# Patient Record
Sex: FEMALE | Race: WHITE
[De-identification: names, ages, dates, MRNs, and addresses within clinical notes are randomized per-mention and may not be internally consistent; named-entity substitution may affect disease eponyms.]

---

## 2017-08-21 ENCOUNTER — HOSPITAL ENCOUNTER (INPATIENT)
Dept: HOSPITAL 56 - MW.ED | Age: 82
LOS: 3 days | Discharge: HOME HEALTH | DRG: 689 | End: 2017-08-24
Attending: FAMILY MEDICINE | Admitting: FAMILY MEDICINE
Payer: MEDICARE

## 2017-08-21 DIAGNOSIS — D50.8: ICD-10-CM

## 2017-08-21 DIAGNOSIS — M25.551: ICD-10-CM

## 2017-08-21 DIAGNOSIS — E11.9: ICD-10-CM
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
## 2017-08-21 DIAGNOSIS — R26.2: ICD-10-CM

## 2017-08-21 DIAGNOSIS — M54.5: ICD-10-CM

## 2017-08-21 DIAGNOSIS — N39.0: Primary | ICD-10-CM

## 2017-08-21 DIAGNOSIS — K57.90: ICD-10-CM

## 2017-08-21 DIAGNOSIS — D50.9: ICD-10-CM
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
## 2017-08-21 DIAGNOSIS — Z88.8: ICD-10-CM
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
## 2017-08-21 DIAGNOSIS — M25.552: ICD-10-CM

## 2017-08-21 DIAGNOSIS — R10.9: ICD-10-CM

## 2017-08-21 DIAGNOSIS — Z95.0: ICD-10-CM

## 2017-08-21 DIAGNOSIS — Z79.899: ICD-10-CM

## 2017-08-21 DIAGNOSIS — J18.9: ICD-10-CM

## 2017-08-21 DIAGNOSIS — I10: ICD-10-CM

## 2017-08-21 LAB
CHLORIDE SERPL-SCNC: 104 MMOL/L (ref 98–110)
SODIUM SERPL-SCNC: 140 MMOL/L (ref 136–146)

## 2017-08-21 PROCEDURE — 85045 AUTOMATED RETICULOCYTE COUNT: CPT

## 2017-08-21 PROCEDURE — 72170 X-RAY EXAM OF PELVIS: CPT

## 2017-08-21 PROCEDURE — 96372 THER/PROPH/DIAG INJ SC/IM: CPT

## 2017-08-21 PROCEDURE — 72100 X-RAY EXAM L-S SPINE 2/3 VWS: CPT

## 2017-08-21 PROCEDURE — 96361 HYDRATE IV INFUSION ADD-ON: CPT

## 2017-08-21 PROCEDURE — 88104 CYTOPATH FL NONGYN SMEARS: CPT

## 2017-08-21 PROCEDURE — 72192 CT PELVIS W/O DYE: CPT

## 2017-08-21 PROCEDURE — 82607 VITAMIN B-12: CPT

## 2017-08-21 PROCEDURE — 96374 THER/PROPH/DIAG INJ IV PUSH: CPT

## 2017-08-21 PROCEDURE — 99285 EMERGENCY DEPT VISIT HI MDM: CPT

## 2017-08-21 PROCEDURE — 74176 CT ABD & PELVIS W/O CONTRAST: CPT

## 2017-08-21 PROCEDURE — 84484 ASSAY OF TROPONIN QUANT: CPT

## 2017-08-21 PROCEDURE — 85025 COMPLETE CBC W/AUTO DIFF WBC: CPT

## 2017-08-21 PROCEDURE — 80053 COMPREHEN METABOLIC PANEL: CPT

## 2017-08-21 PROCEDURE — 96376 TX/PRO/DX INJ SAME DRUG ADON: CPT

## 2017-08-21 PROCEDURE — 82746 ASSAY OF FOLIC ACID SERUM: CPT

## 2017-08-21 PROCEDURE — 36415 COLL VENOUS BLD VENIPUNCTURE: CPT

## 2017-08-21 PROCEDURE — 82962 GLUCOSE BLOOD TEST: CPT

## 2017-08-21 PROCEDURE — 81001 URINALYSIS AUTO W/SCOPE: CPT

## 2017-08-21 PROCEDURE — 93005 ELECTROCARDIOGRAM TRACING: CPT

## 2017-08-21 PROCEDURE — G0378 HOSPITAL OBSERVATION PER HR: HCPCS

## 2017-08-21 PROCEDURE — 83880 ASSAY OF NATRIURETIC PEPTIDE: CPT

## 2017-08-21 PROCEDURE — 82728 ASSAY OF FERRITIN: CPT

## 2017-08-21 PROCEDURE — 80048 BASIC METABOLIC PNL TOTAL CA: CPT

## 2017-08-21 PROCEDURE — 85610 PROTHROMBIN TIME: CPT

## 2017-08-21 PROCEDURE — 83550 IRON BINDING TEST: CPT

## 2017-08-21 PROCEDURE — 97161 PT EVAL LOW COMPLEX 20 MIN: CPT

## 2017-08-21 PROCEDURE — 71010: CPT

## 2017-08-21 RX ADMIN — ONDANSETRON PRN MG: 4 TABLET, ORALLY DISINTEGRATING ORAL at 16:49

## 2017-08-21 RX ADMIN — Medication SCH DROP: at 22:08

## 2017-08-21 NOTE — CT
EXAMINATION: CT pelvis without contrast

 

HISTORY: Fall

 

COMPARISON: 3/6/2017

 

TECHNIQUE: Axial CT images obtained through the pelvis without contrast. Coronal and sagittal recons
tructions obtained.

 

FINDINGS: The osseous structures overall appear mildly osteopenic. No fracture or acute osseous abno
rmality is noted. The SI joints are symmetric. The sacrum appears grossly intact. The iliopectineal 
lines are preserved. Joint spaces appear preserved. Mild to moderate osteophyte formation noted with
in the hips. Hemangiomas are noted within the lower thoracic spine. Moderate facet hypertrophy also 
present at L4-L5 and L5-S1.

 

Diverticulosis without evidence of diverticulitis. Otherwise the visualized intrapelvic structures a
ppear grossly unremarkable.

 

IMPRESSION: 

1. Degenerative changes without acute findings.

2. Diverticulosis.

## 2017-08-21 NOTE — PCM.HP
H&P History of Present Illness





- General


Date of Service: 08/21/17


Admit Problem/Dx: 


 Admission Diagnosis/Problem





Admission Diagnosis/Problem      Ambulatory dysfunction








Source of Information: Patient


History Limitations: Reports: No Limitations





- History of Present Illness


Initial Comments - Free Text/Narative: 





82-year-old female with a medical history of hypertension, type 2 diabetes that 

also has a pacemaker placed that is being admitted secondary to low back and 

left hip pain after suffering a fall this morning. Patient woke up early this 

morning with low back pain and was sitting on the edge of the bed and states 

that she slipped off the edge of the bed and fell onto her butt. Patient did 

not fall onto her hip and states that she only fell onto her butt. She did not 

lose consciousness and did not have any headache or dizziness prior to the 

fall. She states that with the fall she did bump her head against a bedside 

table but does not currently endorse any headache or dizziness. The fall was 

unwitnessed. She was able to get up and ambulate to the living room to get her 

walker which she usually only uses during the night when she needs to get up 

and go to the bathroom. Otherwise, she ambulates independently without 

assistive devices. Patient has never had any surgeries to her low back or hips. 

She rates her left hip and low back pain as 8 out of 10 at its worse. She does 

have increased weakness on the left lower side secondary to pain but denies any 

numbness or tingling. Patient does not have a history of seizures. She has no 

history of osteoporosis or osteopenia. Patient denies any chest pain, 

palpitations, shortness of breath, wheezing, cough, abdominal pain, nausea, 

vomiting, constipation, diarrhea, headache, dizziness, vision problems, tinnitus

, fever. Patient does currently live on her own.





ER course:


Pelvic x-ray shows degenerative changes within the joint spaces bilaterally at 

the hips. No definite displaced fracture. Follow-up pelvic CT shows no fracture 

and only degenerative changes. Patient does have diverticulosis. Chest x-ray 

done in the emergency room shows no acute processes. Lumbar spine x-ray shows 

no fracture and normal anatomical alignment. There are bone spurs at disc 

spaces and hypertrophic changes of the lumbar facets. No significant listhesis 

present. CBC showed a normal white blood cell count with a platelet count of 120

,000. BUN was mildly elevated at 25 with a normal creatinine. Urinalysis was 

negative for nitrites but did show +3 bacteria. Patient denies any discomfort 

with urination. Patient did receive morphine 2 while in the emergency room and 

also normal saline bolus.


  ** Left Hip


Pain Score (Numeric/FACES): 9





  ** Lower Back


Pain Score (Numeric/FACES): 6





- Related Data


Allergies/Adverse Reactions: 


 Allergies











Allergy/AdvReac Type Severity Reaction Status Date / Time


 


codeine Allergy Intermediate Rash Verified 01/18/17 06:33


 


celecoxib [From Celebrex] Allergy  Rash Verified 01/18/17 06:33


 


propoxyphene [From Darvon] Allergy  Rash Verified 08/21/17 07:46











Home Medications: 


 Home Meds





SitaGLIPtin [Januvia] 100 mg PO DAILY 09/18/16 [History]


amLODIPine [Norvasc] 5 mg PO BEDTIME 09/18/16 [History]


Ascorbic Acid [Vitamin C] 500 mg PO DAILY 01/18/17 [History]


Calcium Carbonate/Vitamin D3 [Calcium 600 + Vit D Tablet] 1 tab PO DAILY 01/18/ 17 [History]


Metoprolol Succinate [Toprol XL] 25 mg PO DAILY 01/18/17 [History]


Rosuvastatin [Crestor] 10 mg PO BEDTIME 01/18/17 [History]


Fluticasone Propionate [Flonase Allergy Relief] 2 sprays NASBOTH DAILY 08/21/17 

[History]


Propylene Glycol/Peg 400 [Systane 0.3-0.4% Eye Drops] 1 drop EYEBOTH TID 08/21/ 17 [History]











Past Medical History


Cardiovascular History: Reports: Hypertension


Respiratory History: Reports: None


Other Respiratory History: Hx of pneumonia


Gastrointestinal History: Reports: None


Neurological History: Reports: None


Psychiatric History: Reports: None


Endocrine/Metabolic History: Reports: Diabetes, Type II


Hematologic History: Reports: None


Other Oncologic History: as stated by the pt she has ca of the " pelvis, groin 

and chest" and she was ca free for 3 years now and that she is following up 

with her oncology dr.


Dermatologic History: Reports: None





- Infectious Disease History


Infectious Disease History: Reports: None





- Past Surgical History


HEENT Surgical History: Reports: Eye Surgery


Other HEENT Surgeries/Procedures: Wears glasses


Cardiovascular Surgical History: Reports: Pacer


Female  Surgical History: Reports: Hysterectomy


Musculoskeletal Surgical History: Reports: Knee Replacement





Social & Family History





- Family History


Family Medical History: Noncontributory


Cardiac: Reports: Hypertension, MI


GI: Reports: Other (See Below)


Other GI Family History: Intestinal CA


Endocrine/Metabolic: Reports: Diabetes, type II





- Tobacco Use


Smoking Status *Q: Never Smoker


Second Hand Smoke Exposure: No





- Caffeine Use


Caffeine Use: Reports: None





- Recreational Drug Use


Recreational Drug Use: No





H&P Review of Systems





- Review of Systems:


Review Of Systems: See Below


General: Reports: No Symptoms


HEENT: Reports: No Symptoms


Pulmonary: Reports: No Symptoms


Cardiovascular: Reports: No Symptoms


Gastrointestinal: Reports: No Symptoms


Genitourinary: Reports: No Symptoms


Musculoskeletal: Reports: Other (Left hip pain with low back pain.)


Skin: Reports: No Symptoms


Psychiatric: Reports: No Symptoms


Neurological: Reports: No Symptoms


Hematologic/Lymphatic: Reports: No Symptoms


Immunologic: Reports: No Symptoms





Exam





- Exam


Exam: See Below





- Vital Signs


Vital Signs: 


 Last Vital Signs











Temp  98.5 F   08/21/17 12:07


 


Pulse  76   08/21/17 12:07


 


Resp  18   08/21/17 12:07


 


BP  141/65 H  08/21/17 12:07


 


Pulse Ox  94 L  08/21/17 12:07











Weight: 179 lb 7.3 oz





- Exam


Quality Assessment: DVT Prophylaxis (SCDs, Lovenox.)


General: Alert, Oriented, Cooperative


HEENT: Conjunctiva Clear, EOMI, Hearing Intact, Mucosa Moist & Pink


Neck: Supple, Trachea Midline, 2


Lungs: Clear to Auscultation, Normal Respiratory Effort


Cardiovascular: Regular Rate, Regular Rhythm


GI/Abdominal Exam: Normal Bowel Sounds, Soft, Non-Tender, No Organomegaly, No 

Distention, No Abnormal Bruit, No Mass


Extremities: Other (Pain with palpation over the left greater trochanter of the 

left hip. Patient also has pain with palpation of the low back. Strength is 4 

out of 5 in the left lower extremity secondary to pain. No bruising, erythema 

or redness noted to the left hip. There is no shortening of the left lower 

extremity and the foot is not turned outward. No calf tenderness with palpation 

bilaterally.)


Peripheral Pulses: 2+: Radial (L), Radial (R), Posterior Tibial (L), Posterior 

Tibial (R)


Skin: Warm, Dry, Intact


Neuro Extensive - Mental Status: Alert, Oriented x3, Normal Mood/Affect, Normal 

Cognition


Neuro Extensive - Motor, Sensory, Reflexes: CN II-XII Intact, Other (Sensation 

remains intact light touch in the upper and lower extremities bilaterally.)


Psychiatric: Alert, Normal Affect, Normal Mood





- Patient Data


Result Diagrams: 


 08/21/17 07:39





 08/21/17 07:39





*Q Meaningful Use (ADM)





- VTE *Q


VTE Criteria *Q: 








- Stroke *Q


Stroke Criteria *Q: 








- AMI *Q


AMI Criteria *Q: 








- Problem List


(1) Left hip pain


SNOMED Code(s): 13287939


   ICD Code: M25.552 - PAIN IN LEFT HIP   Status: Acute   Current Visit: Yes   





(2) UTI (urinary tract infection)


SNOMED Code(s): 10150838


   ICD Code: N39.0 - URINARY TRACT INFECTION, SITE NOT SPECIFIED   Status: 

Acute   Current Visit: Yes   


Problem List Initiated/Reviewed/Updated: Yes


Orders Last 24hrs: 


 Active Orders 24 hr











 Category Date Time Status


 


 Antiembolic Devices [RC] PER UNIT ROUTINE Care  08/21/17 12:20 Active


 


 Height and Weight [RC] DAILY Care  08/21/17 12:18 Active


 


 Intake and Output [RC] Q12H Care  08/21/17 12:19 Active


 


 Notify Provider Vital Signs [RC] ASDIRECTED Care  08/21/17 12:19 Active


 


 Oxygen Therapy [RC] PRN Care  08/21/17 12:19 Active


 


 Pulse Oximetry [RC] PRN Care  08/21/17 12:19 Active


 


 Up With Assistance [RC] ASDIRECTED Care  08/21/17 12:18 Active


 


 VTE/DVT Education [RC] PER UNIT ROUTINE Care  08/21/17 12:19 Active


 


 Vital Signs [RC] Q4H Care  08/21/17 12:19 Active


 


 PT Evaluation and Treatment [CONS] Routine Cons  08/21/17 12:18 Active


 


 Heart Healthy Diet [DIET] Diet  08/21/17 Dinner Active


 


 Acetaminophen [Tylenol] Med  08/21/17 12:18 Active





 650 mg PO Q4H PRN   


 


 Ascorbic Acid [Vitamin C] Med  08/22/17 09:00 Active





 500 mg PO DAILY   


 


 Calcium Carbonate [Tums] Med  08/22/17 09:00 Active





 500 mg PO DAILY   


 


 Cholecalciferol (Vitamin D3) [Vitamin D3] Med  08/22/17 09:00 Active





 400 units PO DAILY   


 


 Enoxaparin [Lovenox] Med  08/21/17 12:30 Active





 30 mg SUBCUT DAILY   


 


 Fluticasone Propionate [Flonase] Med  08/22/17 09:00 Active





 0 gm NASBOTH DAILY   


 


 Insulin Aspart [NovoLOG] Med  08/21/17 17:00 Active





 See Protocol  SUBCUT TIDAC   


 


 Metoprolol Succinate [Toprol XL] Med  08/22/17 09:00 Active





 25 mg PO DAILY   


 


 Ondansetron [Zofran ODT] Med  08/21/17 12:18 Active





 4 mg PO Q4H PRN   


 


 Patient's Own Medication [Ptom] Med  08/21/17 22:00 Active





 1 each EYEBOTH TID   


 


 Rosuvastatin [Crestor] Med  08/22/17 09:00 Active





 10 mg PO DAILY   


 


 SitaGLIPtin [Januvia] Med  08/22/17 09:00 Active





 100 mg PO DAILY   


 


 Sulfamethoxazole/Trimethoprim [Septra DS] Med  08/21/17 12:30 Active





 1 tab PO BID   


 


 amLODIPine [Norvasc] Med  08/21/17 21:00 Active





 5 mg PO BEDTIME   


 


 oxyCODONE Med  08/21/17 12:18 Active





 10 mg PO Q4H PRN   


 


 Sequential Compression Device [OM.PC] Per Unit Routine Oth  08/21/17 12:19 

Ordered


 


 Resuscitation Status Routine Resus Stat  08/21/17 12:18 Ordered








 Medication Orders





Acetaminophen (Tylenol)  650 mg PO Q4H PRN


   PRN Reason: Pain (Mild 1-3)/fever


Amlodipine Besylate (Norvasc)  5 mg PO BEDTIME [facility]


Ascorbic Acid (Vitamin C)  500 mg PO DAILY [facility]


Calcium Carbonate/Glycine (Tums)  500 mg PO DAILY [facility]


Cholecalciferol (Vitamin D3)  400 units PO DAILY [facility]


Enoxaparin Sodium (Lovenox)  30 mg SUBCUT DAILY [facility]


   Last Admin: 08/21/17 12:50  Dose: 30 mg


Fluticasone Propionate (Flonase)  0 gm NASBOTH DAILY [facility]


Insulin Aspart (Novolog)  0 unit SUBCUT TIDAC [facility]


   PRN Reason: Protocol


Metoprolol Succinate (Toprol Xl)  25 mg PO DAILY [facility]


Ondansetron HCl (Zofran Odt)  4 mg PO Q4H PRN


   PRN Reason: nausea, able to take PO


Oxycodone HCl (Oxycodone)  10 mg PO Q4H PRN


   PRN Reason: Pain (moderate 4-6)


   Last Admin: 08/21/17 14:41  Dose: 10 mg


Systane 0.3-0.4% Eye (Drops)  1 each EYEBOTH TID [facility]


Rosuvastatin Calcium (Crestor)  10 mg PO DAILY [facility]


Sitagliptin Phosphate (Januvia)  100 mg PO DAILY [facility]


Trimethoprim/Sulfamethoxazole (Septra Ds)  1 tab PO BID PAT


   Last Admin: 08/21/17 12:50  Dose: 1 tab








Assessment/Plan Comment:: 





82-year-old female being admitted with low back and left hip pain after 

suffering an unwitnessed fall. Patient also has a UTI.





#1. Low back/left hip pain:


-Pelvic x-rays and lumbar x-rays were unremarkable for fracture. Follow-up 

pelvic CT showed no evidence of fracture and only diverticulosis.


-PT has been ordered. Oral Pain medications are available as needed for pain 

relief.





#2. UTI: 


-urinalysis shows +3 bacteria and negative nitrates. patient does not have any 

symptoms with urination.


-Patient placed on oral Bactrim double strength twice a day.





#3. Type 2 diabetes: 


-patient will continue with home medication of Januvia. We will check her blood 

sugars throughout the day.


-Patient placed on sliding scale NovoLog. 





All home medications have been restarted.





DVT prophylaxis: SCDs, Lovenox.





Discharge: 1-2 days pending improvement

## 2017-08-21 NOTE — CR
EXAM DATE: 17



PATIENT'S AGE: 82





Patient: MOY DOCKERY



Facility: [place], [place]

Patient ID: [id number]

Site Patient ID: [id number].

Site Accession #: [id number].

: 1934

Study: XRay Chest [id number]/21/2017 8:27:40 AM

Ordering Physician: Jairon Reis



Final Report: 

INDICATION:

Fall.



Technique:

Portable chest. 2017 comparison. 



Impression:

Right Port-A-Cath with central line in the superior vena cava. Left-sided 
pacemaker generator. Right atrial and ventricular pacemaker leads are present.

The lungs are clear. No pneumothorax.

The tracheal air column is slightly deviated to the right at the thoracic 
inlet. This is unchanged. Correlate with physical exam. Benign causes could 
include enlargement of the left thyroid lobe but correlate with clinical 
findings. 

No effusions or pneumothorax.





Dictated by Ronak Goff MD @ Aug 21 2017 8:30AM

(Electronic Signature)



Report Signed by Proxy.
AILYN

## 2017-08-21 NOTE — EDM.PDOC
ED HPI GENERAL MEDICAL PROBLEM





- General


Chief Complaint: Back Pain or Injury


Stated Complaint: AMBULANCE


Time Seen by Provider: 08/21/17 07:19


Source of Information: Reports: Patient





- History of Present Illness


INITIAL COMMENTS - FREE TEXT/NARRATIVE: 


HISTORY AND PHYSICAL:





History of present illness:





This is a 82-year-old female presenting to the emergency department via EMS 

after having a fall at home. She was found after having a fall out of her bed. 

She currently complains of lower back bilateral hip pain. Denies any loss of 

consciousness. He denies hitting her head. She denies any chest pain or 

shortness of breath. Denies any numbness or tingling. Has no other complaints.





Review of systems: 


As per history of present illness and below otherwise all systems reviewed and 

negative.





Past medical history: 


As per history of present illness and as reviewed below otherwise 

noncontributory.





Surgical history: 


As per history of present illness and as reviewed below otherwise 

noncontributory.





Social history: 


No reported history of drug or alcohol abuse.





Family history: 


As per history of present illness and as reviewed below otherwise 

noncontributory.





Physical exam:


HEENT: Atraumatic, normocephalic, pupils reactive, negative for conjunctival 

pallor or scleral icterus, mucous membranes moist, throat clear, neck supple, 

nontender, trachea midline.


Lungs: Clear to auscultation, breath sounds equal bilaterally, chest nontender.


Heart: S1S2, regular, negative for clicks, rubs, or JVD.


Abdomen: Soft, nondistended, nontender. Negative for masses or 

hepatosplenomegaly. Negative for costovertebral tenderness.


Pelvis: Stable nontender.


Genitourinary: Deferred.


Rectal: Deferred.


Extremities: Atraumatic, negative for cords or calf pain. Neurovascular 

unremarkable.


Neuro: Awake, alert, oriented. Cranial nerves II through XII unremarkable. 

Cerebellum unremarkable. Motor and sensory unremarkable throughout. Exam 

nonfocal.





Diagnostics:


CBC


CMP


PT/INR


TROPONIN


CHEST XRAY


Pelvic X-ray


Lumber spine X-ray


EKG


UA


CT pelvis w/o contrast





Therapeutics:


2mg IV morphine





Impression: 


Ambulatory dysfunction





Plan:


Admit to observation. Accepting physician Dr. Javier.








  ** Left Hip


Pain Score (Numeric/FACES): 8





  ** Lower Back


Pain Score (Numeric/FACES): 8





- Related Data


 Allergies











Allergy/AdvReac Type Severity Reaction Status Date / Time


 


codeine Allergy Intermediate Rash Verified 01/18/17 06:33


 


celecoxib [From Celebrex] Allergy  Rash Verified 01/18/17 06:33


 


propoxyphene [From Darvon] Allergy  Rash Verified 08/21/17 07:46











Home Meds: 


 Home Meds





SitaGLIPtin [Januvia] 100 mg PO DAILY 09/18/16 [History]


amLODIPine [Norvasc] 5 mg PO BEDTIME 09/18/16 [History]


Ascorbic Acid [Vitamin C] 500 mg PO DAILY 01/18/17 [History]


Calcium Carbonate/Vitamin D3 [Calcium 600 + Vit D Tablet] 1 tab PO DAILY 01/18/ 17 [History]


Metoprolol Succinate [Toprol XL] 25 mg PO DAILY 01/18/17 [History]


Rosuvastatin [Crestor] 10 mg PO BEDTIME 01/18/17 [History]


Fluticasone Propionate [Flonase Allergy Relief] 2 sprays NASBOTH DAILY 08/21/17 

[History]


Propylene Glycol/Peg 400 [Systane 0.3-0.4% Eye Drops] 1 drop EYEBOTH TID 08/21/ 17 [History]











Past Medical History


Cardiovascular History: Reports: Hypertension


Respiratory History: Reports: None


Gastrointestinal History: Reports: None


Neurological History: Reports: None


Psychiatric History: Reports: None


Endocrine/Metabolic History: Reports: Diabetes, Type II


Hematologic History: Reports: None


Other Oncologic History: as stated by the pt she has ca of the " pelvis, groin 

and chest" and she was ca free for 3 years now and that she is following up 

with her oncology dr.


Dermatologic History: Reports: None





- Infectious Disease History


Infectious Disease History: Reports: None





- Past Surgical History


Musculoskeletal Surgical History: Reports: Knee Replacement





Social & Family History





- Tobacco Use


Smoking Status *Q: Never Smoker


Second Hand Smoke Exposure: No





- Recreational Drug Use


Recreational Drug Use: No





ED ROS GENERAL





- Review of Systems


Review Of Systems: See Below (see dictation)





ED EXAM,LOWER BACK PAIN/INJURY





- Physical Exam


Exam: See Below (See dictation)





Course





- Vital Signs


Last Recorded V/S: 


 Last Vital Signs











Temp  37.2 C   08/22/17 04:00


 


Pulse  71   08/22/17 04:00


 


Resp  18   08/22/17 04:00


 


BP  127/58 L  08/22/17 04:00


 


Pulse Ox  94 L  08/22/17 04:00














- Orders/Labs/Meds


Orders: 


 Medication Orders





Acetaminophen (Tylenol)  650 mg PO Q4H PRN


   PRN Reason: Pain (Mild 1-3)/fever


Amlodipine Besylate (Norvasc)  5 mg PO BEDTIME [facility]


   Last Admin: 08/21/17 21:41  Dose: 5 mg


Artificial Tears (Refresh Plus 0.5%)  0 each EYEBOTH TID [facility]


   Last Admin: 08/22/17 06:15  Dose: 1 drop


   Admin: 08/21/17 22:08  Dose: 1 drop


Ascorbic Acid (Vitamin C)  500 mg PO DAILY [facility]


Calcium Carbonate/Glycine (Tums)  500 mg PO DAILY [facility]


Cholecalciferol (Vitamin D3)  400 units PO DAILY [facility]


Enoxaparin Sodium (Lovenox)  30 mg SUBCUT DAILY [facility]


   Last Admin: 08/21/17 12:50  Dose: 30 mg


Fluticasone Propionate (Flonase)  0 gm NASBOTH DAILY [facility]


Insulin Aspart (Novolog)  0 unit SUBCUT TIDAC [facility]


   PRN Reason: Protocol


   Last Admin: 08/21/17 16:49  Dose: 1 unit


Metoprolol Succinate (Toprol Xl)  25 mg PO DAILY [facility]


Ondansetron HCl (Zofran Odt)  4 mg PO Q4H PRN


   PRN Reason: nausea, able to take PO


   Last Admin: 08/22/17 06:17  Dose: 4 mg


   Admin: 08/21/17 16:49  Dose: 4 mg


Oxycodone HCl (Oxycodone)  10 mg PO Q4H PRN


   PRN Reason: Pain (moderate 4-6)


   Last Admin: 08/21/17 21:40  Dose: 10 mg


   Admin: 08/21/17 14:41  Dose: 10 mg


Rosuvastatin Calcium (Crestor)  10 mg PO DAILY [facility]


Sitagliptin Phosphate (Januvia)  100 mg PO DAILY [facility]


Trimethoprim/Sulfamethoxazole (Septra Ds)  1 tab PO BID [facility]


   Last Admin: 08/21/17 21:41  Dose: 1 tab


   Admin: 08/21/17 12:50  Dose: 1 tab








Labs: 


 Laboratory Tests











  08/21/17 08/21/17 08/21/17 Range/Units





  07:39 07:39 07:39 


 


WBC  8.98    (4.0-11.0)  K/uL


 


RBC  3.73 L    (4.30-5.90)  M/uL


 


Hgb  12.1    (12.0-16.0)  g/dL


 


Hct  34.7 L    (36.0-46.0)  %


 


MCV  93.0    (80.0-98.0)  fL


 


MCH  32.4 H    (27.0-32.0)  pg


 


MCHC  34.9    (31.0-37.0)  g/dL


 


RDW Std Deviation  46.3    (28.0-62.0)  fl


 


RDW Coeff of [place]  14    (11.0-15.0)  %


 


Plt Count  120 L    (150-400)  K/uL


 


MPV  10.60    (7.40-12.00)  fL


 


Neut % (Auto)  86.7 H    (48.0-80.0)  %


 


Lymph % (Auto)  4.7 L    (16.0-40.0)  %


 


Mono % (Auto)  8.5    (0.0-15.0)  %


 


Eos % (Auto)  0.0    (0.0-7.0)  %


 


Baso % (Auto)  0.1    (0.0-1.5)  %


 


Neut # (Auto)  7.8 H    (1.4-5.7)  K/uL


 


Lymph # (Auto)  0.4 L    (0.6-2.4)  K/uL


 


Mono # (Auto)  0.8    (0.0-0.8)  K/uL


 


Eos # (Auto)  0.0    (0.0-0.7)  K/uL


 


Baso # (Auto)  0.0    (0.0-0.1)  K/uL


 


Nucleated RBC %  0.0    /100WBC


 


Nucleated RBCs #  0    K/uL


 


INR     (0.86-1.11)  


 


Sodium   140   (136-146)  mmol/L


 


Potassium   3.7   (3.5-5.1)  mmol/L


 


Chloride   104   ()  mmol/L


 


Carbon Dioxide   23   (21-31)  mmol/L


 


BUN   25 H   (6.0-23.0)  mg/dL


 


Creatinine   1.5   (0.6-1.5)  mg/dL


 


Est Cr Clr Drug Dosing   23.92   mL/min


 


Estimated GFR (MDRD)   33.2   ml/min


 


Glucose   200 H   ()  mg/dL


 


Calcium   10.0   (8.8-10.8)  mg/dL


 


Total Bilirubin   1.7 H   (0.1-1.5)  mg/dL


 


AST   86 H   (5-40)  IU/L


 


ALT   27   (8-54)  IU/L


 


Alkaline Phosphatase   98   ()  


 


Troponin I    < 0.10  (0.0-0.29)  NG/ML


 


Total Protein   7.4   (6.0-8.0)  g/dL


 


Albumin   4.0   (3.4-4.8)  g/dL


 


Globulin   3.4   (2.0-3.5)  g/dL


 


Albumin/Globulin Ratio   1.2 L   (1.3-2.8)  


 


Urine Color     


 


Urine Appearance     


 


Urine pH     (5.0-8.0)  


 


Ur Specific Gravity     (1.001-1.035)  


 


Urine Protein     (NEGATIVE)  mg/dL


 


Urine Glucose (UA)     (NEGATIVE)  mg/dL


 


Urine Ketones     (NEGATIVE)  mg/dL


 


Urine Occult Blood     (NEGATIVE)  


 


Urine Nitrite     (NEGATIVE)  


 


Urine Bilirubin     (NEGATIVE)  


 


Urine Urobilinogen     (<2.0)  EU/dL


 


Ur Leukocyte Esterase     (NEGATIVE)  


 


Urine RBC     (0-2/HPF)  


 


Urine WBC     (0-5/HPF)  


 


Ur Epithelial Cells     (NONE-FEW)  


 


Urine Bacteria     (NEGATIVE)  














  08/21/17 08/21/17 Range/Units





  08:57 09:35 


 


WBC    (4.0-11.0)  K/uL


 


RBC    (4.30-5.90)  M/uL


 


Hgb    (12.0-16.0)  g/dL


 


Hct    (36.0-46.0)  %


 


MCV    (80.0-98.0)  fL


 


MCH    (27.0-32.0)  pg


 


MCHC    (31.0-37.0)  g/dL


 


RDW Std Deviation    (28.0-62.0)  fl


 


RDW Coeff of [place]    (11.0-15.0)  %


 


Plt Count    (150-400)  K/uL


 


MPV    (7.40-12.00)  fL


 


Neut % (Auto)    (48.0-80.0)  %


 


Lymph % (Auto)    (16.0-40.0)  %


 


Mono % (Auto)    (0.0-15.0)  %


 


Eos % (Auto)    (0.0-7.0)  %


 


Baso % (Auto)    (0.0-1.5)  %


 


Neut # (Auto)    (1.4-5.7)  K/uL


 


Lymph # (Auto)    (0.6-2.4)  K/uL


 


Mono # (Auto)    (0.0-0.8)  K/uL


 


Eos # (Auto)    (0.0-0.7)  K/uL


 


Baso # (Auto)    (0.0-0.1)  K/uL


 


Nucleated RBC %    /100WBC


 


Nucleated RBCs #    K/uL


 


INR  1.10   (0.86-1.11)  


 


Sodium    (136-146)  mmol/L


 


Potassium    (3.5-5.1)  mmol/L


 


Chloride    ()  mmol/L


 


Carbon Dioxide    (21-31)  mmol/L


 


BUN    (6.0-23.0)  mg/dL


 


Creatinine    (0.6-1.5)  mg/dL


 


Est Cr Clr Drug Dosing    mL/min


 


Estimated GFR (MDRD)    ml/min


 


Glucose    ()  mg/dL


 


Calcium    (8.8-10.8)  mg/dL


 


Total Bilirubin    (0.1-1.5)  mg/dL


 


AST    (5-40)  IU/L


 


ALT    (8-54)  IU/L


 


Alkaline Phosphatase    ()  


 


Troponin I    (0.0-0.29)  NG/ML


 


Total Protein    (6.0-8.0)  g/dL


 


Albumin    (3.4-4.8)  g/dL


 


Globulin    (2.0-3.5)  g/dL


 


Albumin/Globulin Ratio    (1.3-2.8)  


 


Urine Color   YELLOW  


 


Urine Appearance   CLEAR  


 


Urine pH   5.0  (5.0-8.0)  


 


Ur Specific Gravity   1.025  (1.001-1.035)  


 


Urine Protein   100  (NEGATIVE)  mg/dL


 


Urine Glucose (UA)   NEGATIVE  (NEGATIVE)  mg/dL


 


Urine Ketones   15 H  (NEGATIVE)  mg/dL


 


Urine Occult Blood   LARGE H  (NEGATIVE)  


 


Urine Nitrite   NEGATIVE  (NEGATIVE)  


 


Urine Bilirubin   NEGATIVE  (NEGATIVE)  


 


Urine Urobilinogen   1.0  (<2.0)  EU/dL


 


Ur Leukocyte Esterase   LARGE  (NEGATIVE)  


 


Urine RBC   2-3  (0-2/HPF)  


 


Urine WBC   45-50  (0-5/HPF)  


 


Ur Epithelial Cells   MODERATE  (NONE-FEW)  


 


Urine Bacteria   3+ H  (NEGATIVE)  











Meds: 


Medications











Generic Name Dose Route Start Last Admin





  Trade Name Freq  PRN Reason Stop Dose Admin


 


Acetaminophen  650 mg  08/21/17 12:18  





  Tylenol  PO   





  Q4H PRN   





  Pain (Mild 1-3)/fever   


 


Amlodipine Besylate  5 mg  08/21/17 21:00  08/21/17 21:41





  Norvasc  PO   5 mg





  BEDTIME [facility]   Administration


 


Artificial Tears  0 each  08/21/17 22:00  08/22/17 06:15





  Refresh Plus 0.5%  EYEBOTH   1 drop





  TID [facility]   Administration


 


Ascorbic Acid  500 mg  08/22/17 09:00  





  Vitamin C  PO   





  DAILY [facility]   


 


Calcium Carbonate/Glycine  500 mg  08/22/17 09:00  





  Tums  PO   





  DAILY [facility]   


 


Cholecalciferol  400 units  08/22/17 09:00  





  Vitamin D3  PO   





  DAILY [facility]   


 


Enoxaparin Sodium  30 mg  08/21/17 12:30  08/21/17 12:50





  Lovenox  SUBCUT   30 mg





  DAILY [facility]   Administration


 


Fluticasone Propionate  0 gm  08/22/17 09:00  





  Flonase  NASBOTH   





  DAILY [facility]   


 


Insulin Aspart  0 unit  08/21/17 17:00  08/21/17 16:49





  Novolog  SUBCUT   1 unit





  TIDAC [facility]   Administration





  Protocol   


 


Metoprolol Succinate  25 mg  08/22/17 09:00  





  Toprol Xl  PO   





  DAILY [facility]   


 


Ondansetron HCl  4 mg  08/21/17 12:18  08/22/17 06:17





  Zofran Odt  PO   4 mg





  Q4H PRN   Administration





  nausea, able to take PO   


 


Oxycodone HCl  10 mg  08/21/17 12:18  08/21/17 21:40





  Oxycodone  PO   10 mg





  Q4H PRN   Administration





  Pain (moderate 4-6)   


 


Rosuvastatin Calcium  10 mg  08/22/17 09:00  





  Crestor  PO   





  DAILY [facility]   


 


Sitagliptin Phosphate  100 mg  08/22/17 09:00  





  Januvia  PO   





  DAILY [facility]   


 


Trimethoprim/Sulfamethoxazole  1 tab  08/21/17 12:30  08/21/17 21:41





  Septra Ds  PO   1 tab





  BID [facility]   Administration














Discontinued Medications














Generic Name Dose Route Start Last Admin





  Trade Name Freq  PRN Reason Stop Dose Admin


 


Sodium Chloride  1,000 mls @ 999 mls/hr  08/21/17 07:23  08/21/17 09:04





  Normal Saline  IV  08/21/17 08:23  200 mls/hr





  STAT ONE   Infusion


 


Morphine Sulfate  2 mg  08/21/17 07:23  08/21/17 07:42





  Morphine  IVPUSH  08/21/17 07:24  2 mg





  ONETIME ONE   Administration


 


Morphine Sulfate  2 mg  08/21/17 09:41  08/21/17 09:47





  Morphine  IVPUSH  08/21/17 09:42  2 mg





  ONETIME ONE   Administration


 


Systane 0.3-0.4% Eye  1 each  08/21/17 22:00  





  Drops  EYEBOTH   





  TID [facility]   














Departure





- Departure


Time of Disposition: 11:59


Disposition: Refer to Observation


Condition: Fair


Clinical Impression: 


 Ambulatory dysfunction








- Discharge Information

## 2017-08-21 NOTE — CR
EXAM DATE: 17



PATIENT'S AGE: 82





Patient: MOY DOCKERY



Facility: [place], [place]

Patient ID: [id number]

Site Patient ID: [id number].

Site Accession #: [id number].

: 1934

Study: XRay Spine Lumbar [id number]/21/2017 8:28:47 AM

Ordering Physician: Jairon Reis



Final Report: 

Lumbar spine 

3 VIEWS



INDICATION:



Injury.



IMPRESSION:

No visualized fracture.

Alignments anatomic.

Overlying backboard is present. 

The lumbar spine shows multilevel annular spurring at the disc spaces and 
hypertrophic changes in the lumbar facet joints. No significant listhesis.





Dictated by Ronak Goff MD @ Aug 21 2017 8:33AM

(Electronic Signature)



Report Signed by Proxy.
AILYN

## 2017-08-21 NOTE — CR
EXAM DATE: 17



PATIENT'S AGE: 82





Patient: MOY DOCKERY



Facility: [place], [place]

Patient ID: [id number]

Site Patient ID: [id number].

Site Accession #: [id number].

: 1934

Study: XRay Pelvis [id number]/21/2017 8:27:11 AM

Ordering Physician: Jairon Reis



Final Report: 

INDICATION:

Fall.



Technique:

Pelvis and right hip.



Impression.:

An overlying backboard is present. There is asymmetric linear density 
projecting over the intertrochanteric region of the right femur. The medial 
margin of this extends beyond the cortex also this could represent a 
superimposition. The position and alignment appear normal. No definite 
displaced fracture. 

Both hip joints show degeneration with joint space narrowing and marginal 
osteophytic spurring. Small surgical clips are present at the pelvis.

Suggest repeat exam when the patient is off of the backboard with additional 
views of the right hip.





Dictated by Ronak Goff MD @ Aug 21 2017 8:27AM

(Electronic Signature)



Report Signed by Proxy.
AILYN

## 2017-08-22 LAB
CHLORIDE SERPL-SCNC: 105 MMOL/L (ref 98–110)
SODIUM SERPL-SCNC: 138 MMOL/L (ref 136–146)

## 2017-08-22 RX ADMIN — ONDANSETRON PRN MG: 4 TABLET, ORALLY DISINTEGRATING ORAL at 06:17

## 2017-08-22 RX ADMIN — CHOLECALCIFEROL TAB 10 MCG (400 UNIT) SCH UNITS: 10 TAB at 09:03

## 2017-08-22 RX ADMIN — Medication SCH DROP: at 06:15

## 2017-08-22 RX ADMIN — CEFTRIAXONE SCH MLS/HR: 1 INJECTION, SOLUTION INTRAVENOUS at 13:12

## 2017-08-22 RX ADMIN — Medication SCH DROP: at 14:22

## 2017-08-22 RX ADMIN — FLUTICASONE PROPIONATE SCH SPRAY: 50 SPRAY, METERED NASAL at 09:04

## 2017-08-22 RX ADMIN — ONDANSETRON PRN MG: 4 TABLET, ORALLY DISINTEGRATING ORAL at 21:50

## 2017-08-22 RX ADMIN — Medication SCH DROP: at 21:16

## 2017-08-22 NOTE — PCM.PN
- General Info


Date of Service: 08/22/17


Admission Dx/Problem (Free Text): 


 Admission Diagnosis/Problem





Admission Diagnosis/Problem      Ambulatory dysfunction








Subjective Update: 





Feeling kind  of blah today, feels slightly nauseated and dizzy. She is mostly 

dizzy when she stands up. Denies chest pain or SOB. No Palpitations. Pain to L 

hip improved, she is able to ambulate with assistance and a walker. 


Functional Status: Reports: Pain Controlled, Tolerating Diet, Ambulating, 

Urinating





- Review of Systems


General: Reports: Malaise.  Denies: Fever


HEENT: Reports: No Symptoms.  Denies: Eye Pain, Headaches, Sore Throat, Rhinitis


Pulmonary: Reports: No Symptoms.  Denies: Shortness of Breath, Cough, Sputum


Cardiovascular: Reports: Lightheadedness.  Denies: Chest Pain


Gastrointestinal: Reports: Abdominal Pain, Nausea, Vomiting


Genitourinary: Reports: No Symptoms.  Denies: Dysuria, Frequency, Burning


Musculoskeletal: Reports: Joint Pain (L hip, but improved with medication)


Neurological: Reports: Dizziness


Psychiatric: Reports: No Symptoms





- Patient Data


Vitals - Most Recent: 


 Last Vital Signs











Temp  98.3 F   08/22/17 08:00


 


Pulse  75   08/22/17 08:00


 


Resp  20   08/22/17 08:00


 


BP  123/56 L  08/22/17 08:00


 


Pulse Ox  93 L  08/22/17 08:00











Weight - Most Recent: 81.4 kg


I&O - Last 24 Hours: 


 Intake & Output











 08/21/17 08/22/17 08/22/17





 22:59 06:59 14:59


 


Intake Total 1300 150 


 


Output Total 1 400 


 


Balance 1299 -250 











Lab Results Last 24 Hours: 


 Laboratory Results - last 24 hr











  08/21/17 08/21/17 08/22/17 Range/Units





  16:17 21:04 04:50 


 


WBC    6.92  (4.0-11.0)  K/uL


 


RBC    3.03 L  (4.30-5.90)  M/uL


 


Hgb    9.6 L  (12.0-16.0)  g/dL


 


Hct    28.5 L  (36.0-46.0)  %


 


MCV    94.1  (80.0-98.0)  fL


 


MCH    31.7  (27.0-32.0)  pg


 


MCHC    33.7  (31.0-37.0)  g/dL


 


RDW Std Deviation    47.4  (28.0-62.0)  fl


 


RDW Coeff of [place]    14  (11.0-15.0)  %


 


Plt Count    118 L  (150-400)  K/uL


 


MPV    10.40  (7.40-12.00)  fL


 


Neut % (Auto)    83.9 H  (48.0-80.0)  %


 


Lymph % (Auto)    6.8 L  (16.0-40.0)  %


 


Mono % (Auto)    9.1  (0.0-15.0)  %


 


Eos % (Auto)    0.1  (0.0-7.0)  %


 


Baso % (Auto)    0.1  (0.0-1.5)  %


 


Neut # (Auto)    5.8 H  (1.4-5.7)  K/uL


 


Lymph # (Auto)    0.5 L  (0.6-2.4)  K/uL


 


Mono # (Auto)    0.6  (0.0-0.8)  K/uL


 


Eos # (Auto)    0.0  (0.0-0.7)  K/uL


 


Baso # (Auto)    0.0  (0.0-0.1)  K/uL


 


Nucleated RBC %    0.0  /100WBC


 


Nucleated RBCs #    0  K/uL


 


Absolute Retic     (20-80)  K/uL


 


Percent Retic     (0.5-1.5)  %


 


Immature Retic Fraction     %


 


Sodium     (136-146)  mmol/L


 


Potassium     (3.5-5.1)  mmol/L


 


Chloride     ()  mmol/L


 


Carbon Dioxide     (21-31)  mmol/L


 


BUN     (6.0-23.0)  mg/dL


 


Creatinine     (0.6-1.5)  mg/dL


 


Est Cr Clr Drug Dosing     mL/min


 


Estimated GFR (MDRD)     ml/min


 


Glucose     ()  mg/dL


 


POC Glucose  158 H  162 H   ()  mg/dL


 


Calcium     (8.8-10.8)  mg/dL


 


Iron     ()  ug/dL


 


TIBC     (273-456)  ug/dL


 


% Saturation     (20-55)  %


 


Transferrin     (200-400)  ug/dL














  08/22/17 08/22/17 08/22/17 Range/Units





  04:50 04:55 04:55 


 


WBC     (4.0-11.0)  K/uL


 


RBC   3.01 L   (4.30-5.90)  M/uL


 


Hgb     (12.0-16.0)  g/dL


 


Hct     (36.0-46.0)  %


 


MCV     (80.0-98.0)  fL


 


MCH     (27.0-32.0)  pg


 


MCHC     (31.0-37.0)  g/dL


 


RDW Std Deviation     (28.0-62.0)  fl


 


RDW Coeff of [place]     (11.0-15.0)  %


 


Plt Count     (150-400)  K/uL


 


MPV     (7.40-12.00)  fL


 


Neut % (Auto)     (48.0-80.0)  %


 


Lymph % (Auto)     (16.0-40.0)  %


 


Mono % (Auto)     (0.0-15.0)  %


 


Eos % (Auto)     (0.0-7.0)  %


 


Baso % (Auto)     (0.0-1.5)  %


 


Neut # (Auto)     (1.4-5.7)  K/uL


 


Lymph # (Auto)     (0.6-2.4)  K/uL


 


Mono # (Auto)     (0.0-0.8)  K/uL


 


Eos # (Auto)     (0.0-0.7)  K/uL


 


Baso # (Auto)     (0.0-0.1)  K/uL


 


Nucleated RBC %     /100WBC


 


Nucleated RBCs #     K/uL


 


Absolute Retic   57.80   (20-80)  K/uL


 


Percent Retic   1.9 H   (0.5-1.5)  %


 


Immature Retic Fraction   10   %


 


Sodium  138    (136-146)  mmol/L


 


Potassium  3.6    (3.5-5.1)  mmol/L


 


Chloride  105    ()  mmol/L


 


Carbon Dioxide  23    (21-31)  mmol/L


 


BUN  25 H    (6.0-23.0)  mg/dL


 


Creatinine  1.2    (0.6-1.5)  mg/dL


 


Est Cr Clr Drug Dosing  31.21    mL/min


 


Estimated GFR (MDRD)  43.0    ml/min


 


Glucose  128 H    ()  mg/dL


 


POC Glucose     ()  mg/dL


 


Calcium  8.8    (8.8-10.8)  mg/dL


 


Iron    19 L  ()  ug/dL


 


TIBC    197 L  (273-456)  ug/dL


 


% Saturation    9.64 L  (20-55)  %


 


Transferrin    138 L  (200-400)  ug/dL











Med Orders - Current: 


 Current Medications





Acetaminophen (Tylenol)  650 mg PO Q4H PRN


   PRN Reason: Pain (Mild 1-3)/fever


Amlodipine Besylate (Norvasc)  5 mg PO BEDTIME [facility]


   Last Admin: 08/21/17 21:41 Dose:  5 mg


Artificial Tears (Refresh Plus 0.5%)  0 each EYEBOTH TID [facility]


   Last Admin: 08/22/17 06:15 Dose:  1 drop


Ascorbic Acid (Vitamin C)  500 mg PO DAILY [facility]


Calcium Carbonate/Glycine (Tums)  500 mg PO DAILY [facility]


Cholecalciferol (Vitamin D3)  400 units PO DAILY [facility]


Fluticasone Propionate (Flonase)  0 gm NASBOTH DAILY [facility]


Insulin Aspart (Novolog)  0 unit SUBCUT TIDAC [facility]


   PRN Reason: Protocol


   Last Admin: 08/22/17 07:38 Dose:  Not Given


Metoprolol Succinate (Toprol Xl)  25 mg PO DAILY [facility]


Ondansetron HCl (Zofran Odt)  4 mg PO Q4H PRN


   PRN Reason: nausea, able to take PO


   Last Admin: 08/22/17 06:17 Dose:  4 mg


Oxycodone HCl (Oxycodone)  5 mg PO Q4H PRN


   PRN Reason: Pain (moderate 4-6)


Rosuvastatin Calcium (Crestor)  10 mg PO DAILY [facility]


Sitagliptin Phosphate (Januvia)  100 mg PO DAILY [facility]


Trimethoprim/Sulfamethoxazole (Septra Ds)  1 tab PO BID [facility]


   Last Admin: 08/21/17 21:41 Dose:  1 tab





Discontinued Medications





Enoxaparin Sodium (Lovenox)  30 mg SUBCUT DAILY [facility]


   Last Admin: 08/21/17 12:50 Dose:  30 mg


Sodium Chloride (Normal Saline)  1,000 mls @ 999 mls/hr IV STAT ONE


   Stop: 08/21/17 08:23


   Last Infusion: 08/21/17 09:04 Dose:  200 mls/hr


Morphine Sulfate (Morphine)  2 mg IVPUSH ONETIME ONE


   Stop: 08/21/17 07:24


   Last Admin: 08/21/17 07:42 Dose:  2 mg


Morphine Sulfate (Morphine)  2 mg IVPUSH ONETIME ONE


   Stop: 08/21/17 09:42


   Last Admin: 08/21/17 09:47 Dose:  2 mg


Oxycodone HCl (Oxycodone)  10 mg PO Q4H PRN


   PRN Reason: Pain (moderate 4-6)


   Last Admin: 08/21/17 21:40 Dose:  10 mg


Systane 0.3-0.4% Eye (Drops)  1 each EYEBOTH TID PAT











- Exam


General: Alert, Oriented, Cooperative, No Acute Distress


HEENT: Pupils Equal


Neck: Supple, No JVD


Lungs: Clear to Auscultation, Normal Respiratory Effort


Cardiovascular: Regular Rate, Regular Rhythm


GI/Abdominal Exam: Normal Bowel Sounds, Soft, No Organomegaly, No Distention, 

No Mass, Guarding (LLQ), Tender (LLQ)


Extremities: Normal Inspection, Normal Range of Motion, Non-Tender, No Pedal 

Edema, Normal Capillary Refill


Skin: Other (pale in appearance.)


Neurological: No New Focal Deficit


Psy/Mental Status: Alert, Normal Affect, Normal Mood





- Problem List & Annotations


(1) Abdominal pain


SNOMED Code(s): 70827828


   Code(s): R10.9 - UNSPECIFIED ABDOMINAL PAIN   Status: Acute   Current Visit: 

Yes   





(2) Ambulatory dysfunction


SNOMED Code(s): 766609050


   Code(s): R26.2 - DIFFICULTY IN WALKING, NOT ELSEWHERE CLASSIFIED   Status: 

Acute   Current Visit: Yes   





(3) Left hip pain


SNOMED Code(s): 91103077


   Code(s): M25.552 - PAIN IN LEFT HIP   Status: Acute   Current Visit: Yes   





(4) UTI (urinary tract infection)


SNOMED Code(s): 42587876


   Code(s): N39.0 - URINARY TRACT INFECTION, SITE NOT SPECIFIED   Status: Acute

   Current Visit: Yes   





(5) DM type 2 (diabetes mellitus, type 2)


SNOMED Code(s): 79968093


   Code(s): E11.9 - TYPE 2 DIABETES MELLITUS WITHOUT COMPLICATIONS   Status: 

Chronic   Current Visit: Yes   


Qualifiers: 


   Diabetes mellitus complication status: without complication   Diabetes 

mellitus long term insulin use: without long term use   Qualified Code(s): 

E11.9 - Type 2 diabetes mellitus without complications   





(6) HTN (hypertension)


SNOMED Code(s): 18350299


   Code(s): I10 - ESSENTIAL (PRIMARY) HYPERTENSION   Status: Acute   Current 

Visit: Yes   


Qualifiers: 


   Hypertension type: essential hypertension   Qualified Code(s): I10 - 

Essential (primary) hypertension   





(7) Anemia


SNOMED Code(s): 639601043


   Code(s): D64.9 - ANEMIA, UNSPECIFIED   Status: Acute   Current Visit: Yes   


Qualifiers: 


   Anemia type: iron deficiency   Iron deficiency anemia type: other iron 

deficiency   Qualified Code(s): D50.8 - Other iron deficiency anemias   





- Problem List Review


Problem List Initiated/Reviewed/Updated: Yes





- My Orders


Last 24 Hours: 


My Active Orders





08/22/17 04:55


FERRITIN [REF] Routine 


FOLIC ACID [CHEM] Routine 


VITAMIN B12 [CHEM] Routine 





08/22/17 08:06


CULTURE URINE [RM] Routine 





08/22/17 08:27


Fecal Occult Blood Collection [RC] ASDIRECTED 


OCCULT BLOOD DIAGNOSTIC [OP] Routine 





08/22/17 08:56


Orthostatic Vital Signs [RC] Q12HR 





08/22/17 08:57


oxyCODONE   5 mg PO Q4H PRN 














- Plan


Plan:: 





82-year-old female being admitted with low back and left hip pain after 

suffering an unwitnessed fall. Patient also has a UTI.





1. Low back/left hip pain: Continue with PT. Oral Pain medications are 

available as needed for pain relief. Pain has improved, she is able to ambulate 

with walker. 





2. UTI: UC pending. Continues to have no symptoms.





3. Type 2 diabetes: Continue with home medication of Januvia. Check her blood 

sugars TIDAC. Continue SSI, NovoLog. BS controlled. 





4. Anemia: Hgb dropped to 9.6 today, Plt 118,000. She denies any bleeding, does 

have some LLQ tenderness/pain with palpation. Will obtain Abd/pelvis CT, iron 

studies and obtain hemoccult. C/O dizziness with getting up, obtain Orthostatic 

VS. 





5. HTN: Continue Norvasc, Metoprolol. 





6. Abdominal pain: Will obtain Abdominal/pelvis CT. nauseated and just does not 

feel well. Pelvic CT yesterday noted diverticulosis, question possible 

diverticulitis now due to new LLQ tenderness. Will stop Bactrim and start 

Flagyl and Cipro IV. Change to CL diet and monitor. 





7. Hypoxia: Does not complain of this, but appears dyspneic with speech and 

Hypoxic on RA. Will obtain PA/LAT CXR, BNP, and ECHO.





DVT prophylaxis: SCDs, DC Lovenox due to drop in hgb and work up for anemia





Discharge: 1-2 days pending improvement





Discussed patient in detail with Dr. Amos and Dr. Javier, all in agreement 

with current treatment plan.

## 2017-08-22 NOTE — CT
CT of the abdomen and pelvis without contrast.

 

HISTORY: Left lower quadrant pain

 

TECHNIQUE: Axial CT images were obtained of the abdomen and pelvis without contrast. Coronal and sag
ittal reconstructions obtained.

 

Comparison: 3/6/2017.

 

FINDINGS: 

There is atelectasis within the lung bases.

 

The liver, spleen, and pancreas appear unremarkable for noncontrast examination. The gallbladder isidoro
ears normal. There is mild nodular thickening of the adrenal glands bilaterally, grossly unchanged. 
There is no bulky retroperitoneal lymphadenopathy. No abdominal ascites.

 

There are no calcifications noted within the kidneys or along the courses of the ureters bilaterally
. There is mild to moderate left perirenal stranding.

 

The large and small bowel are normal in caliber without evidence of obstruction. The appendix appear
s normal. Diverticulosis without evidence of diverticulitis. There is no bulky pelvic lymphadenopath
y. No free fluid. No free air. The urinary bladder appears normal.

 

The visualized osseous structures appear osteopenic. Hemangiomas noted within the lower lumbar spine
, unchanged.

 

IMPRESSION: 

 

1. Left perirenal stranding, this could represent a recently passed stone versus an underlying UTI.

2. Diverticulosis without evidence of diverticulitis.

## 2017-08-22 NOTE — PCM.SN
- Free Text/Narrative


Note: 


Abd/pelvis noted perirenal stranding to L, likely cause of abdominal pain. No 

outward signs of passing a stone. Likely due to UTI. Will stop Cipro and Flagyl 

since she has not received these yet and place on Rocephin IV until 

sensitivities available from culture.

## 2017-08-23 LAB
CHLORIDE SERPL-SCNC: 106 MMOL/L (ref 98–110)
SODIUM SERPL-SCNC: 140 MMOL/L (ref 136–146)

## 2017-08-23 RX ADMIN — FLUTICASONE PROPIONATE SCH SPRAY: 50 SPRAY, METERED NASAL at 09:00

## 2017-08-23 RX ADMIN — Medication SCH DROP: at 14:22

## 2017-08-23 RX ADMIN — CHOLECALCIFEROL TAB 10 MCG (400 UNIT) SCH UNITS: 10 TAB at 08:58

## 2017-08-23 RX ADMIN — Medication SCH DROP: at 06:17

## 2017-08-23 RX ADMIN — CEFTRIAXONE SCH MLS/HR: 1 INJECTION, SOLUTION INTRAVENOUS at 12:58

## 2017-08-23 RX ADMIN — Medication SCH DROP: at 21:13

## 2017-08-23 NOTE — PCM.PN
- General Info


Date of Service: 08/23/17


Admission Dx/Problem (Free Text): 


 Admission Diagnosis/Problem





Admission Diagnosis/Problem      UTI, CAP, fall








Subjective Update: 


Sitting up in chair afte eating breakfast, appears very well this morning. She 

reports she is feeling much better today, some pain to L SI joint region, no 

bruising noted. Some pain to L hip when ambulating but this too has improved. 

No further abdominal pain, feeling bloated and constipated though. Denies 

nausea today. No chest pain or SOB, still requiring 1 L NC oxygen.





Functional Status: Reports: Pain Controlled, Tolerating Diet, Ambulating, 

Urinating





- Review of Systems


General: Reports: No Symptoms.  Denies: Fever


HEENT: Reports: No Symptoms.  Denies: Ear Pain, Headaches, Sore Throat, Visual 

Changes


Pulmonary: Reports: No Symptoms.  Denies: Shortness of Breath, Cough, Sputum


Cardiovascular: Reports: No Symptoms.  Denies: Chest Pain, Palpitations, 

Lightheadedness


Gastrointestinal: Reports: Constipation.  Denies: Abdominal Pain, Nausea, 

Vomiting


Genitourinary: Reports: No Symptoms.  Denies: Dysuria, Frequency, Burning


Musculoskeletal: Reports: Joint Pain (L hip and L SI joint region)


Skin: Reports: No Symptoms


Neurological: Reports: No Symptoms


Psychiatric: Reports: No Symptoms





- Patient Data


Vitals - Most Recent: 


 Last Vital Signs











Temp  97.6 F   08/23/17 08:00


 


Pulse  67   08/23/17 08:57


 


Resp  16   08/23/17 08:00


 


BP  114/53 L  08/23/17 08:57


 


Pulse Ox  95   08/23/17 08:00








 





Orthostatic Blood Pressure [     129/60


Standing]                        


Orthostatic Blood Pressure [     115/57


Sitting]                         


Orthostatic Blood Pressure [     127/59


Supine]                          








Weight - Most Recent: 81.4 kg


I&O - Last 24 Hours: 


 Intake & Output











 08/22/17 08/23/17 08/23/17





 22:59 06:59 14:59


 


Intake Total 500 368 


 


Output Total 320 400 


 


Balance 180 -32 











Lab Results Last 24 Hours: 


 Laboratory Results - last 24 hr











  08/22/17 08/22/17 08/22/17 Range/Units





  12:55 13:17 16:36 


 


WBC     (4.0-11.0)  K/uL


 


RBC     (4.30-5.90)  M/uL


 


Hgb   10.3 L   (12.0-16.0)  g/dL


 


Hct   29.8 L   (36.0-46.0)  %


 


MCV     (80.0-98.0)  fL


 


MCH     (27.0-32.0)  pg


 


MCHC     (31.0-37.0)  g/dL


 


RDW Std Deviation     (28.0-62.0)  fl


 


RDW Coeff of [place]     (11.0-15.0)  %


 


Plt Count     (150-400)  K/uL


 


MPV     (7.40-12.00)  fL


 


Neut % (Auto)     (48.0-80.0)  %


 


Lymph % (Auto)     (16.0-40.0)  %


 


Mono % (Auto)     (0.0-15.0)  %


 


Eos % (Auto)     (0.0-7.0)  %


 


Baso % (Auto)     (0.0-1.5)  %


 


Neut # (Auto)     (1.4-5.7)  K/uL


 


Lymph # (Auto)     (0.6-2.4)  K/uL


 


Mono # (Auto)     (0.0-0.8)  K/uL


 


Eos # (Auto)     (0.0-0.7)  K/uL


 


Baso # (Auto)     (0.0-0.1)  K/uL


 


Nucleated RBC %     /100WBC


 


Nucleated RBCs #     K/uL


 


Sodium     (136-146)  mmol/L


 


Potassium     (3.5-5.1)  mmol/L


 


Chloride     ()  mmol/L


 


Carbon Dioxide     (21-31)  mmol/L


 


BUN     (6.0-23.0)  mg/dL


 


Creatinine     (0.6-1.5)  mg/dL


 


Est Cr Clr Drug Dosing     mL/min


 


Estimated GFR (MDRD)     ml/min


 


Glucose     ()  mg/dL


 


POC Glucose  124 H   157 H  ()  mg/dL


 


Calcium     (8.8-10.8)  mg/dL














  08/23/17 08/23/17 Range/Units





  05:03 05:03 


 


WBC  5.96   (4.0-11.0)  K/uL


 


RBC  3.07 L   (4.30-5.90)  M/uL


 


Hgb  9.6 L   (12.0-16.0)  g/dL


 


Hct  28.7 L   (36.0-46.0)  %


 


MCV  93.5   (80.0-98.0)  fL


 


MCH  31.3   (27.0-32.0)  pg


 


MCHC  33.4   (31.0-37.0)  g/dL


 


RDW Std Deviation  47.0   (28.0-62.0)  fl


 


RDW Coeff of [place]  14   (11.0-15.0)  %


 


Plt Count  119 L   (150-400)  K/uL


 


MPV  10.70   (7.40-12.00)  fL


 


Neut % (Auto)  80.1 H   (48.0-80.0)  %


 


Lymph % (Auto)  8.6 L   (16.0-40.0)  %


 


Mono % (Auto)  10.6   (0.0-15.0)  %


 


Eos % (Auto)  0.5   (0.0-7.0)  %


 


Baso % (Auto)  0.2   (0.0-1.5)  %


 


Neut # (Auto)  4.8   (1.4-5.7)  K/uL


 


Lymph # (Auto)  0.5 L   (0.6-2.4)  K/uL


 


Mono # (Auto)  0.6   (0.0-0.8)  K/uL


 


Eos # (Auto)  0.0   (0.0-0.7)  K/uL


 


Baso # (Auto)  0.0   (0.0-0.1)  K/uL


 


Nucleated RBC %  0.0   /100WBC


 


Nucleated RBCs #  0   K/uL


 


Sodium   140  (136-146)  mmol/L


 


Potassium   4.2  (3.5-5.1)  mmol/L


 


Chloride   106  ()  mmol/L


 


Carbon Dioxide   25  (21-31)  mmol/L


 


BUN   26 H  (6.0-23.0)  mg/dL


 


Creatinine   1.1  (0.6-1.5)  mg/dL


 


Est Cr Clr Drug Dosing   34.05  mL/min


 


Estimated GFR (MDRD)   47.6  ml/min


 


Glucose   113 H  ()  mg/dL


 


POC Glucose    ()  mg/dL


 


Calcium   9.1  (8.8-10.8)  mg/dL











Med Orders - Current: 


 Current Medications





Acetaminophen (Tylenol)  650 mg PO Q4H PRN


   PRN Reason: Pain (Mild 1-3)/fever


   Last Admin: 08/22/17 21:08 Dose:  650 mg


Amlodipine Besylate (Norvasc)  5 mg PO BEDTIME [facility]


   Last Admin: 08/22/17 21:08 Dose:  5 mg


Artificial Tears (Refresh Plus 0.5%)  0 each EYEBOTH TID [facility]


   Last Admin: 08/23/17 06:17 Dose:  1 drop


Ascorbic Acid (Vitamin C)  500 mg PO DAILY [facility]


   Last Admin: 08/23/17 08:58 Dose:  500 mg


Azithromycin (Zithromax)  250 mg PO Q24H [facility]


Calcium Carbonate/Glycine (Tums)  500 mg PO DAILY [facility]


   Last Admin: 08/23/17 08:59 Dose:  500 mg


Cholecalciferol (Vitamin D3)  400 units PO DAILY [facility]


   Last Admin: 08/23/17 08:58 Dose:  400 units


Fluticasone Propionate (Flonase)  0 gm NASBOTH DAILY [facility]


   Last Admin: 08/23/17 09:00 Dose:  1 spray


Ceftriaxone Sodium/Dextrose 1 (gm/ Premix)  50 mls @ 100 mls/hr IV Q24H [facility]


   Last Admin: 08/22/17 13:12 Dose:  100 mls/hr


Insulin Aspart (Novolog)  0 unit SUBCUT TIDAC [facility]


   PRN Reason: Protocol


   Last Admin: 08/23/17 06:36 Dose:  Not Given


Metoprolol Succinate (Toprol Xl)  25 mg PO DAILY [facility]


   Last Admin: 08/23/17 08:57 Dose:  25 mg


Ondansetron HCl (Zofran Odt)  4 mg PO Q4H PRN


   PRN Reason: nausea, able to take PO


   Last Admin: 08/22/17 21:50 Dose:  4 mg


Oxycodone HCl (Oxycodone)  5 mg PO Q4H PRN


   PRN Reason: Pain (moderate 4-6)


   Last Admin: 08/22/17 22:07 Dose:  5 mg


Polysaccharide Iron Complex (Ferrex 150)  150 mg PO DAILY [facility]


   Last Admin: 08/23/17 09:36 Dose:  150 mg


Rosuvastatin Calcium (Crestor)  10 mg PO DAILY [facility]


   Last Admin: 08/23/17 08:59 Dose:  10 mg


Sitagliptin Phosphate (Januvia)  100 mg PO DAILY [facility]


   Last Admin: 08/23/17 09:36 Dose:  100 mg





Discontinued Medications





Azithromycin (Zithromax)  500 mg PO Q24H ONE


   Stop: 08/22/17 17:01


   Last Admin: 08/22/17 16:37 Dose:  500 mg


Enoxaparin Sodium (Lovenox)  30 mg SUBCUT DAILY [facility]


   Last Admin: 08/21/17 12:50 Dose:  30 mg


Sodium Chloride (Normal Saline)  1,000 mls @ 999 mls/hr IV STAT ONE


   Stop: 08/21/17 08:23


   Last Infusion: 08/21/17 09:04 Dose:  200 mls/hr


Ciprofloxacin/Dextrose 400 mg/ (Premix)  200 mls @ 200 mls/hr IV Q12H [facility]


   Last Admin: 08/22/17 13:01 Dose:  Not Given


Metronidazole 500 mg/ Premix  100 mls @ 100 mls/hr IV QID [facility]


   Last Admin: 08/22/17 13:00 Dose:  Not Given


Magnesium Hydroxide (Milk Of Magnesia)  30 ml PO ONETIME ONE


   Stop: 08/23/17 09:19


   Last Admin: 08/23/17 09:36 Dose:  30 ml


Morphine Sulfate (Morphine)  2 mg IVPUSH ONETIME ONE


   Stop: 08/21/17 07:24


   Last Admin: 08/21/17 07:42 Dose:  2 mg


Morphine Sulfate (Morphine)  2 mg IVPUSH ONETIME ONE


   Stop: 08/21/17 09:42


   Last Admin: 08/21/17 09:47 Dose:  2 mg


Oxycodone HCl (Oxycodone)  10 mg PO Q4H PRN


   PRN Reason: Pain (moderate 4-6)


   Last Admin: 08/21/17 21:40 Dose:  10 mg


Systane 0.3-0.4% Eye (Drops)  1 each EYEBOTH TID [facility]


Trimethoprim/Sulfamethoxazole (Septra Ds)  1 tab PO BID [facility]


   Last Admin: 08/22/17 09:04 Dose:  1 tab











- Exam


General: Alert, Oriented, Cooperative, No Acute Distress


Neck: Supple


Lungs: Clear to Auscultation, Normal Respiratory Effort


Cardiovascular: Regular Rate, Regular Rhythm, No Murmurs


GI/Abdominal Exam: Normal Bowel Sounds, Soft, Non-Tender, No Organomegaly, No 

Distention, No Abnormal Bruit


Back Exam: Normal Inspection, Full Range of Motion, Other (tenderness noted 

over L SI joint, no bruising noted, ).  No: Paraspinal Tenderness, Vertebral 

Tenderness


Extremities: Normal Inspection, Normal Range of Motion, Non-Tender, No Pedal 

Edema, Normal Capillary Refill


Neurological: No New Focal Deficit





- Problem List & Annotations


(1) Abdominal pain


SNOMED Code(s): 80954847


   Code(s): R10.9 - UNSPECIFIED ABDOMINAL PAIN   Status: Acute   Current Visit: 

Yes   





(2) Ambulatory dysfunction


SNOMED Code(s): 930301974


   Code(s): R26.2 - DIFFICULTY IN WALKING, NOT ELSEWHERE CLASSIFIED   Status: 

Acute   Current Visit: Yes   





(3) Left hip pain


SNOMED Code(s): 31693643


   Code(s): M25.552 - PAIN IN LEFT HIP   Status: Acute   Current Visit: Yes   





(4) UTI (urinary tract infection)


SNOMED Code(s): 75965886


   Code(s): N39.0 - URINARY TRACT INFECTION, SITE NOT SPECIFIED   Status: Acute

   Current Visit: Yes   





(5) DM type 2 (diabetes mellitus, type 2)


SNOMED Code(s): 44692647


   Code(s): E11.9 - TYPE 2 DIABETES MELLITUS WITHOUT COMPLICATIONS   Status: 

Chronic   Current Visit: Yes   


Qualifiers: 


   Diabetes mellitus complication status: without complication   Diabetes 

mellitus long term insulin use: without long term use   Qualified Code(s): 

E11.9 - Type 2 diabetes mellitus without complications   





(6) HTN (hypertension)


SNOMED Code(s): 61076793


   Code(s): I10 - ESSENTIAL (PRIMARY) HYPERTENSION   Status: Acute   Current 

Visit: Yes   


Qualifiers: 


   Hypertension type: essential hypertension   Qualified Code(s): I10 - 

Essential (primary) hypertension   





(7) Anemia


SNOMED Code(s): 082222055


   Code(s): D64.9 - ANEMIA, UNSPECIFIED   Status: Acute   Current Visit: Yes   


Qualifiers: 


   Anemia type: iron deficiency   Iron deficiency anemia type: other iron 

deficiency   Qualified Code(s): D50.8 - Other iron deficiency anemias   





- Problem List Review


Problem List Initiated/Reviewed/Updated: Yes





- My Orders


Last 24 Hours: 


My Active Orders





08/22/17 13:00


cefTRIAXone [Rocephin in Dextrose,Iso-Osm 1 GM/50 ML] 1 gm   Premix Bag 1 bag 

IV Q24H 





08/22/17 19:28


Hemoccult [OCCULT BLOOD DIAGNOSTIC] [OP] Routine 





08/23/17 09:30


Iron Polysaccharides Complex [Ferrex 150]   150 mg PO DAILY 





08/23/17 12:00


Azithromycin [Zithromax]   250 mg PO Q24H 














- Plan


Plan:: 





82-year-old female being admitted with low back and left hip pain after 

suffering an unwitnessed fall. Patient also has a UTI.





1. Low back/left hip pain: Continues to improve, continue with PT. Oral Pain 

medications are available as needed for pain relief. Pain has improved, she is 

able to ambulate with walker. 





2. UTI: UC pending. Continues to have no symptoms. Continue with Rocephin IV.





3. Type 2 diabetes: Stable. Continue with home medication of Januvia. Check her 

blood sugars TIDAC. Continue SSI, NovoLog. 





4. Anemia: Hgb 9.6 this am, hemoccult pending. , Plt 118,000. Orthostatic VS 

negative. Iron studies show iron deficiency anemia, will start PO Iron, 

peripheral smear shows thrombocytopenia and lymphopenia, may be acute reaction 

to illness vs primary bone marrow disorder, will have her follow with PCP 

regarding this.  





5. HTN: Continue Norvasc, Metoprolol. 





6. Abdominal pain: Abd CT revealed some perirenal stranding on L, Continue with 

Rocephin for UTI, no signs of passing stone. Likely due to UTI. No further pain 

today.





7. Hypoxia: Continues to feel ok, no SOB. Appears less dyspneic today. ECHO 

revealed LVEF 60-65 with some impaired relaxation of LV diastolic filling, mild 

aortic valve regurg, mild miltral valve regurg, and tricuspid regurgitation. 

Hypoxia likely secondary to mild CAP, Azithromycin added with Rocephin. 

Continue to wean off O2 today. 





DVT prophylaxis: SCDs





Discharge: 1-2 days pending improvement

## 2017-08-24 VITALS — DIASTOLIC BLOOD PRESSURE: 57 MMHG | SYSTOLIC BLOOD PRESSURE: 119 MMHG

## 2017-08-24 LAB
CHLORIDE SERPL-SCNC: 104 MMOL/L (ref 98–110)
SODIUM SERPL-SCNC: 139 MMOL/L (ref 136–146)

## 2017-08-24 RX ADMIN — CHOLECALCIFEROL TAB 10 MCG (400 UNIT) SCH UNITS: 10 TAB at 08:46

## 2017-08-24 RX ADMIN — FLUTICASONE PROPIONATE SCH SPRAY: 50 SPRAY, METERED NASAL at 08:47

## 2017-08-24 RX ADMIN — Medication SCH: at 08:23

## 2017-08-24 RX ADMIN — Medication SCH DROP: at 13:58

## 2017-08-25 NOTE — ECHO
EXAM DATE: 08/22/17



PATIENT'S AGE: 82





The echocardiogram report can be seen in this patient's EMR (Electronic Medical 
Record) in the Reports section. The report has also been scanned into PACs.



AILYN

## 2019-03-19 NOTE — [id number]
**Discharge Summary





- Hospital Course


Brief History: This 82 year old female with a pmh of HTN, type 2 diabetes that 

also has a pacemaker placed that is being admitted secondary to low back and 

left hip pain after suffering a fall the morning of 8/22/2017. Patient woke up 

early that morning with low back pain and was sitting on the edge of the bed 

and states that she slipped off the edge of the bed and fell onto her butt. 

Patient did not fall onto her hip and states that she only fell onto her butt. 

She did not lose consciousness and did not have any headache or dizziness prior 

to the fall. She states that with the fall she did bump her head against a 

bedside table but does not currently endorse any headache or dizziness. The 

fall was unwitnessed. She was able to get up and ambulate to the living room to 

get her walker which she usually only uses during the night when she needs to 

get up and go to the bathroom. Otherwise, she ambulates independently without 

assistive devices. Patient has never had any surgeries to her low back or hips. 

She rates her left hip and low back pain as 8 out of 10 at its worse. She does 

have increased weakness on the left lower side secondary to pain but denies any 

numbness or tingling. Patient does not have a history of seizures. She has no 

history of osteoporosis or osteopenia. Patient denies any chest pain, 

palpitations, shortness of breath, wheezing, cough, abdominal pain, nausea, 

vomiting, constipation, diarrhea, headache, dizziness, vision problems, tinnitus

, fever. Patient does currently live on her own.  In the ED, Pelvic x-ray shows 

degenerative changes within the joint spaces bilaterally at the hips. No 

definite displaced fracture. Follow-up pelvic CT shows no fracture and only 

degenerative changes. Patient does have diverticulosis. Chest x-ray done in the 

emergency room shows no acute processes. Lumbar spine x-ray shows no fracture 

and normal anatomical alignment. There are bone spurs at disc spaces and 

hypertrophic changes of the lumbar facets. CBC showed a normal white blood cell 

count with a platelet count of 120,000. BUN was mildly elevated at 25 with a 

normal creatinine. Urinalysis was negative for nitrites but did show +3 

bacteria. Patient denies any discomfort with urination. Patient did receive 

morphine 2 while in the emergency room and also normal saline bolus.





- Discharge Data


Discharge Date: 08/24/17


Discharge Disposition: Home, W [facility]


Condition: Good





- Discharge Diagnosis/Problem(s)


(1) UTI (urinary tract infection)


SNOMED Code(s): 36230808


   ICD Code: N39.0 - URINARY TRACT INFECTION, SITE NOT SPECIFIED   Status: 

Acute   Current Visit: Yes   





(2) Community acquired pneumonia


SNOMED Code(s): 993336878


   ICD Code: J18.9 - PNEUMONIA, UNSPECIFIED ORGANISM   Status: Acute   Current 

Visit: Yes   





(3) Ambulatory dysfunction


SNOMED Code(s): 097589514


   ICD Code: R26.2 - DIFFICULTY IN WALKING, NOT ELSEWHERE CLASSIFIED   Status: 

Acute   Current Visit: Yes   





(4) Left hip pain


SNOMED Code(s): 19118821


   ICD Code: M25.552 - PAIN IN LEFT HIP   Status: Acute   Current Visit: Yes   





(5) DM type 2 (diabetes mellitus, type 2)


SNOMED Code(s): 20350264


   ICD Code: E11.9 - TYPE 2 DIABETES MELLITUS WITHOUT COMPLICATIONS   Status: 

Chronic   Current Visit: Yes   


Qualifiers: 


   Diabetes mellitus complication status: without complication   Diabetes 

mellitus long term insulin use: without long term use   Qualified Code(s): 

E11.9 - Type 2 diabetes mellitus without complications   





(6) HTN (hypertension)


SNOMED Code(s): 97774291


   ICD Code: I10 - ESSENTIAL (PRIMARY) HYPERTENSION   Status: Acute   Current 

Visit: Yes   


Qualifiers: 


   Hypertension type: essential hypertension   Qualified Code(s): I10 - 

Essential (primary) hypertension   





(7) Anemia


SNOMED Code(s): 861178234


   ICD Code: D64.9 - ANEMIA, UNSPECIFIED   Status: Acute   Current Visit: Yes   


Qualifiers: 


   Anemia type: iron deficiency   Iron deficiency anemia type: other iron 

deficiency   Qualified Code(s): D50.8 - Other iron deficiency anemias   





(8) Abdominal pain


SNOMED Code(s): 15417731


   ICD Code: R10.9 - UNSPECIFIED ABDOMINAL PAIN   Status: Resolved   Current 

Visit: Yes   





- Patient Instructions


Diet: Heart Healthy Diet, Diabetic Diet


Activity: As Tolerated


Showering/Bathing: May Shower


Notify Provider of: Fever, Increased Pain, Swelling and Redness, Drainage, 

Nausea and/or Vomiting





- Discharge Plan


Prescriptions/Med Rec: 


Acetaminophen [Tylenol Extra Strength] 1,000 mg PO Q8H PRN #60 tablet


 PRN Reason: Pain


Cefdinir [IJD: Cefdinir] 300 mg PO BID #10 capsule


Iron Polysaccharides Complex [Ferrex 150] 150 mg PO DAILY #30 cap


oxyCODONE 5 mg PO Q6H PRN #15 tablet


 PRN Reason: Pain


Home Medications: 


 Home Meds





SitaGLIPtin [Januvia] 100 mg PO DAILY 09/18/16 [History]


amLODIPine [Norvasc] 5 mg PO BEDTIME 09/18/16 [History]


Ascorbic Acid [Vitamin C] 500 mg PO DAILY 01/18/17 [History]


Calcium Carbonate/Vitamin D3 [Calcium 600 + Vit D Tablet] 1 tab PO DAILY 01/18/ 17 [History]


Metoprolol Succinate [Toprol XL] 25 mg PO DAILY 01/18/17 [History]


Rosuvastatin [Crestor] 10 mg PO BEDTIME 01/18/17 [History]


Fluticasone Propionate [Flonase Allergy Relief] 2 sprays NASBOTH DAILY 08/21/17 

[History]


Propylene Glycol/Peg 400 [Systane 0.3-0.4% Eye Drops] 1 drop EYEBOTH TID 08/21/ 17 [History]


Acetaminophen [Tylenol Extra Strength] 1,000 mg PO Q8H PRN #60 tablet 08/24/17 [

Rx]


Cefdinir [IJD: Cefdinir] 300 mg PO BID #10 capsule 08/24/17 [Rx]


Iron Polysaccharides Complex [Ferrex 150] 150 mg PO DAILY #30 cap 08/24/17 [Rx]


oxyCODONE 5 mg PO Q6H PRN #15 tablet 08/24/17 [Rx]








Patient Handouts:  Oxycodone tablets or capsules, Polysaccharide-Iron Complex 

tablets or capsules, Urinary Tract Infection, Adult, Easy-to-Read, 

Acetaminophen tablets or caplets


Referrals: 


[facility] [Outside]


Ricki Rizvi MD [Primary Care Provider] - 08/29/17 10:45 am





- Discharge Summary/Plan Comment


DC Time >30 min.: No


Discharge Summary/Plan Comment: 





Discharge Diagnoses:





UTI


CAP


Back pain, arthritis


Fall


HTN


Pacemaker


DM Type 2





Tiffany was admitted and treated with Bactrim initially for UTI. On Day 2, she 

felt blah, was very nauseated and was now requiring oxygen, 2 L NC. She was 

also having some LLQ abdominal pain. Abdominal/pelvis CT obtained which shower 

deniz-renal stranding, likey due to UTI. She then was worked up for hypoxia, PA/

Lat chest xray showed L lower base atelectasis vs infiltrate. She was then 

started on Rocephin and Azithromycin for CAP and UTI. She then continued to 

improve over the next couple days. Pain continued to low back and L hip but was 

improving and she was able to ambulate with walker. Today she continues to be 

improved. UC revealed mixed cookie 10,000-100,000, no further abdominal pain and 

she is tolerating diet well. She was weaned off oxygen. She will be discharged 

home today, she will be going to stay with her daughter for awhile for extra 

support. She will be discharged with Home Health as well. She is in need of 

skilled care for new medication and recent hospitalization. She is also in need 

of PT/OT evaluation and treatment due to gait instability and generalized 

weakness. OT evaluation would be beneficial to complete a home assessment since 

she does live alone. She is homebound due to the inability to drive, the need 

for walker due to unstable gait, deconditioning due to fall and recent 

hospitalization. Dr. Ricki Rizvi is aware of admission and will follow with plan 

of care. She is to return to clinic for follow up as arranged. She is to return 

to ED or clinic if concerns should arise. 











- General Info


Date of Service: 08/24/17


Admission Dx/Problem (Free Text: 


 Admission Diagnosis/Problem





Admission Diagnosis/Problem      UTI, CAP, fall








Subjective Update: 


Doing well this morning, sitting up in the chair, still complaining of some 

constipation. Denies SOB or Chest pain. No further abdominal pain. 





Functional Status: Reports: Pain Controlled, Tolerating Diet, Ambulating, 

Urinating





- Review of Systems


General: Reports: No Symptoms


HEENT: Reports: No Symptoms.  Denies: Contact Lenses, Ear Pain, Sore Throat


Pulmonary: Reports: No Symptoms.  Denies: Shortness of Breath, Cough, Sputum, 

Hemoptysis


Cardiovascular: Reports: No Symptoms.  Denies: Chest Pain, Dyspnea on Exertion, 

Edema


Gastrointestinal: Reports: No Symptoms.  Denies: Abdominal Pain, Nausea, 

Vomiting


Genitourinary: Reports: No Symptoms.  Denies: Dysuria, Frequency, Burning


Musculoskeletal: Reports: Back Pain (low back and L SI joint region. )


Skin: Reports: No Symptoms


Neurological: Reports: No Symptoms


Psychiatric: Reports: No Symptoms





- Patient Data


Vitals - Most Recent: 


 Last Vital Signs











Temp  97.2 F   08/24/17 11:17


 


Pulse  60   08/24/17 11:17


 


Resp  20   08/24/17 11:17


 


BP  119/57 L  08/24/17 11:17


 


Pulse Ox  96   08/24/17 07:00








 





Orthostatic Blood Pressure [     113/53


Standing]                        


Orthostatic Blood Pressure [     117/59


Sitting]                         


Orthostatic Blood Pressure [     134/62


Supine]                          








Weight - Most Recent: 81.4 kg


I&O - Last 24 hours: 


 Intake & Output











 08/23/17 08/24/17 08/24/17





 22:59 06:59 14:59


 


Intake Total 270  240


 


Output Total 500  


 


Balance -230  240











Lab Results - Last 24 hrs: 


 Laboratory Results - last 24 hr











  08/22/17 08/23/17 08/23/17 Range/Units





  20:35 06:13 11:29 


 


WBC     (4.0-11.0)  K/uL


 


RBC     (4.30-5.90)  M/uL


 


Hgb     (12.0-16.0)  g/dL


 


Hct     (36.0-46.0)  %


 


MCV     (80.0-98.0)  fL


 


MCH     (27.0-32.0)  pg


 


MCHC     (31.0-37.0)  g/dL


 


RDW Std Deviation     (28.0-62.0)  fl


 


RDW Coeff of Beth[place]     (11.0-15.0)  %


 


Plt Count     (150-400)  K/uL


 


MPV     (7.40-12.00)  fL


 


Neut % (Auto)     (48.0-80.0)  %


 


Lymph % (Auto)     (16.0-40.0)  %


 


Mono % (Auto)     (0.0-15.0)  %


 


Eos % (Auto)     (0.0-7.0)  %


 


Baso % (Auto)     (0.0-1.5)  %


 


Neut # (Auto)     (1.4-5.7)  K/uL


 


Lymph # (Auto)     (0.6-2.4)  K/uL


 


Mono # (Auto)     (0.0-0.8)  K/uL


 


Eos # (Auto)     (0.0-0.7)  K/uL


 


Baso # (Auto)     (0.0-0.1)  K/uL


 


Nucleated RBC %     /100WBC


 


Nucleated RBCs #     K/uL


 


Sodium     (136-146)  mmol/L


 


Potassium     (3.5-5.1)  mmol/L


 


Chloride     ()  mmol/L


 


Carbon Dioxide     (21-31)  mmol/L


 


BUN     (6.0-23.0)  mg/dL


 


Creatinine     (0.6-1.5)  mg/dL


 


Est Cr Clr Drug Dosing     mL/min


 


Estimated GFR (MDRD)     ml/min


 


Glucose     ()  mg/dL


 


POC Glucose  122 H  112 H  114 H  ()  mg/dL


 


Calcium     (8.8-10.8)  mg/dL














  08/23/17 08/23/17 08/24/17 Range/Units





  16:42 23:02 04:55 


 


WBC    6.03  (4.0-11.0)  K/uL


 


RBC    2.96 L  (4.30-5.90)  M/uL


 


Hgb    9.4 L  (12.0-16.0)  g/dL


 


Hct    27.8 L  (36.0-46.0)  %


 


MCV    93.9  (80.0-98.0)  fL


 


MCH    31.8  (27.0-32.0)  pg


 


MCHC    33.8  (31.0-37.0)  g/dL


 


RDW Std Deviation    48.1  (28.0-62.0)  fl


 


RDW Coeff of Bethany    14  (11.0-15.0)  %


 


Plt Count    133 L  (150-400)  K/uL


 


MPV    10.60  (7.40-12.00)  fL


 


Neut % (Auto)    78.2  (48.0-80.0)  %


 


Lymph % (Auto)    9.0 L  (16.0-40.0)  %


 


Mono % (Auto)    11.3  (0.0-15.0)  %


 


Eos % (Auto)    1.2  (0.0-7.0)  %


 


Baso % (Auto)    0.3  (0.0-1.5)  %


 


Neut # (Auto)    4.7  (1.4-5.7)  K/uL


 


Lymph # (Auto)    0.5 L  (0.6-2.4)  K/uL


 


Mono # (Auto)    0.7  (0.0-0.8)  K/uL


 


Eos # (Auto)    0.1  (0.0-0.7)  K/uL


 


Baso # (Auto)    0.0  (0.0-0.1)  K/uL


 


Nucleated RBC %    0.0  /100WBC


 


Nucleated RBCs #    0  K/uL


 


Sodium     (136-146)  mmol/L


 


Potassium     (3.5-5.1)  mmol/L


 


Chloride     ()  mmol/L


 


Carbon Dioxide     (21-31)  mmol/L


 


BUN     (6.0-23.0)  mg/dL


 


Creatinine     (0.6-1.5)  mg/dL


 


Est Cr Clr Drug Dosing     mL/min


 


Estimated GFR (MDRD)     ml/min


 


Glucose     ()  mg/dL


 


POC Glucose  110  117 H   ()  mg/dL


 


Calcium     (8.8-10.8)  mg/dL














  08/24/17 Range/Units





  05:00 


 


WBC   (4.0-11.0)  K/uL


 


RBC   (4.30-5.90)  M/uL


 


Hgb   (12.0-16.0)  g/dL


 


Hct   (36.0-46.0)  %


 


MCV   (80.0-98.0)  fL


 


MCH   (27.0-32.0)  pg


 


MCHC   (31.0-37.0)  g/dL


 


RDW Std Deviation   (28.0-62.0)  fl


 


RDW Coeff of Beth[place]   (11.0-15.0)  %


 


Plt Count   (150-400)  K/uL


 


MPV   (7.40-12.00)  fL


 


Neut % (Auto)   (48.0-80.0)  %


 


Lymph % (Auto)   (16.0-40.0)  %


 


Mono % (Auto)   (0.0-15.0)  %


 


Eos % (Auto)   (0.0-7.0)  %


 


Baso % (Auto)   (0.0-1.5)  %


 


Neut # (Auto)   (1.4-5.7)  K/uL


 


Lymph # (Auto)   (0.6-2.4)  K/uL


 


Mono # (Auto)   (0.0-0.8)  K/uL


 


Eos # (Auto)   (0.0-0.7)  K/uL


 


Baso # (Auto)   (0.0-0.1)  K/uL


 


Nucleated RBC %   /100WBC


 


Nucleated RBCs #   K/uL


 


Sodium  139  (136-146)  mmol/L


 


Potassium  4.3  (3.5-5.1)  mmol/L


 


Chloride  104  ()  mmol/L


 


Carbon Dioxide  26  (21-31)  mmol/L


 


BUN  26 H  (6.0-23.0)  mg/dL


 


Creatinine  1.0  (0.6-1.5)  mg/dL


 


Est Cr Clr Drug Dosing  37.73  mL/min


 


Estimated GFR (MDRD)  53.1  ml/min


 


Glucose  128 H  ()  mg/dL


 


POC Glucose   ()  mg/dL


 


Calcium  8.9  (8.8-10.8)  mg/dL











MALINI Results - Last 24 hrs: 


 Microbiology











 08/22/17 16:52 Urine Culture - Final





 Urine, Clean Catch    MIXED COOKIE 10,000-100,000 CFU/ML











Med Orders - Current: 


 Current Medications





Acetaminophen (Tylenol)  650 mg PO Q4H PRN


   PRN Reason: Pain (Mild 1-3)/fever


   Last Admin: 08/22/17 21:08 Dose:  650 mg


Amlodipine Besylate (Norvasc)  5 mg PO BEDTIME [facility]


   Last Admin: 08/23/17 21:12 Dose:  5 mg


Artificial Tears (Refresh Plus 0.5%)  0 each EYEBOTH TID [facility]


   Last Admin: 08/24/17 08:23 Dose:  Not Given


Ascorbic Acid (Vitamin C)  500 mg PO DAILY [facility]


   Last Admin: 08/24/17 08:41 Dose:  500 mg


Calcium Carbonate/Glycine (Tums)  500 mg PO DAILY [facility]


   Last Admin: 08/24/17 08:46 Dose:  500 mg


Cefdinir (Omnicef)  300 mg PO BID [facility]


   Last Admin: 08/24/17 10:52 Dose:  300 mg


Cholecalciferol (Vitamin D3)  400 units PO DAILY [facility]


   Last Admin: 08/24/17 08:46 Dose:  400 units


Fluticasone Propionate (Flonase)  0 gm NASBOTH DAILY [facility]


   Last Admin: 08/24/17 08:47 Dose:  2 spray


Insulin Aspart (Novolog)  0 unit SUBCUT TIDAC [facility]


   PRN Reason: Protocol


   Last Admin: 08/24/17 08:24 Dose:  Not Given


Metoprolol Succinate (Toprol Xl)  25 mg PO DAILY [facility]


   Last Admin: 08/24/17 08:42 Dose:  25 mg


Ondansetron HCl (Zofran Odt)  4 mg PO Q4H PRN


   PRN Reason: nausea, able to take PO


   Last Admin: 08/22/17 21:50 Dose:  4 mg


Oxycodone HCl (Oxycodone)  5 mg PO Q4H PRN


   PRN Reason: Pain (moderate 4-6)


   Last Admin: 08/24/17 08:44 Dose:  5 mg


Polysaccharide Iron Complex (Ferrex 150)  150 mg PO DAILY [facility]


   Last Admin: 08/24/17 08:41 Dose:  150 mg


Rosuvastatin Calcium (Crestor)  10 mg PO DAILY [facility]


   Last Admin: 08/24/17 08:43 Dose:  10 mg


Sitagliptin Phosphate (Januvia)  100 mg PO DAILY [facility]


   Last Admin: 08/24/17 08:41 Dose:  100 mg





Discontinued Medications





Azithromycin (Zithromax)  500 mg PO Q24H ONE


   Stop: 08/22/17 17:01


   Last Admin: 08/22/17 16:37 Dose:  500 mg


Azithromycin (Zithromax)  250 mg PO Q24H [facility]


   Last Admin: 08/23/17 12:12 Dose:  250 mg


Bisacodyl (Dulcolax)  10 mg .ROUTE .STK-MED ONE


   Stop: 08/24/17 08:12


Bisacodyl (Dulcolax)  10 mg RECTAL ONETIME ONE


   Stop: 08/24/17 09:31


   Last Admin: 08/24/17 09:19 Dose:  10 mg


Enoxaparin Sodium (Lovenox)  30 mg SUBCUT DAILY [facility]


   Last Admin: 08/21/17 12:50 Dose:  30 mg


Sodium Chloride (Normal Saline)  1,000 mls @ 999 mls/hr IV STAT ONE


   Stop: 08/21/17 08:23


   Last Infusion: 08/21/17 09:04 Dose:  200 mls/hr


Ciprofloxacin/Dextrose 400 mg/ (Premix)  200 mls @ 200 mls/hr IV Q12H [facility]


   Last Admin: 08/22/17 13:01 Dose:  Not Given


Metronidazole 500 mg/ Premix  100 mls @ 100 mls/hr IV QID [facility]


   Last Admin: 08/22/17 13:00 Dose:  Not Given


Ceftriaxone Sodium/Dextrose 1 (gm/ Premix)  50 mls @ 100 mls/hr IV Q24H [facility]


   Last Admin: 08/23/17 12:58 Dose:  100 mls/hr


Magnesium Hydroxide (Milk Of Magnesia)  30 ml PO ONETIME ONE


   Stop: 08/23/17 09:19


   Last Admin: 08/23/17 09:36 Dose:  30 ml


Morphine Sulfate (Morphine)  2 mg IVPUSH ONETIME ONE


   Stop: 08/21/17 07:24


   Last Admin: 08/21/17 07:42 Dose:  2 mg


Morphine Sulfate (Morphine)  2 mg IVPUSH ONETIME ONE


   Stop: 08/21/17 09:42


   Last Admin: 08/21/17 09:47 Dose:  2 mg


Oxycodone HCl (Oxycodone)  10 mg PO Q4H PRN


   PRN Reason: Pain (moderate 4-6)


   Last Admin: 08/21/17 21:40 Dose:  10 mg


Systane 0.3-0.4% Eye (Drops)  1 each EYEBOTH TID [facility]


Trimethoprim/Sulfamethoxazole (Septra Ds)  1 tab PO BID PAT


   Last Admin: 08/22/17 09:04 Dose:  1 tab











- Exam


Quality Assessment: Denies: Supplemental Oxygen


General: Reports: Alert, Oriented, Cooperative, No Acute Distress


HEENT: Reports: Pupils Equal, Pupils Reactive, EOMI, Mucous Membr. Moist/Pink


Neck: Reports: Supple, No JVD


Lungs: Reports: Clear to Auscultation, Normal Respiratory Effort


Cardiovascular: Reports: Regular Rate, Regular Rhythm, No Murmurs


GI/Abdominal Exam: Normal Bowel Sounds, Soft, Non-Tender, No Organomegaly, No 

Distention, No Abnormal Bruit, No Mass, Pelvis Stable


Extremities: Normal Inspection, Normal Range of Motion, Non-Tender, Pedal Edema 

(trace)


Neurological: Reports: No New Focal Deficit


Psy/Mental Status: Reports: Alert, Normal Affect, Normal Mood





*Q Meaningful Use (DIS)





- VTE *Q


VTE Criteria *Q: 








- Stroke *Q


Stroke Criteria *Q: 








- AMI *Q


AMI Criteria *Q:
19-Mar-2019 13:49

## 2019-09-12 ENCOUNTER — HOSPITAL ENCOUNTER (EMERGENCY)
Dept: HOSPITAL 56 - MW.ED | Age: 84
Discharge: HOME | End: 2019-09-12
Payer: MEDICARE

## 2019-09-12 VITALS — HEART RATE: 60 BPM | SYSTOLIC BLOOD PRESSURE: 143 MMHG | DIASTOLIC BLOOD PRESSURE: 64 MMHG

## 2019-09-12 DIAGNOSIS — Z79.899: ICD-10-CM

## 2019-09-12 DIAGNOSIS — C76.1: ICD-10-CM

## 2019-09-12 DIAGNOSIS — Z88.5: ICD-10-CM

## 2019-09-12 DIAGNOSIS — Z88.8: ICD-10-CM

## 2019-09-12 DIAGNOSIS — C76.3: ICD-10-CM

## 2019-09-12 DIAGNOSIS — E11.9: ICD-10-CM

## 2019-09-12 DIAGNOSIS — I10: Primary | ICD-10-CM

## 2019-09-12 LAB
BUN SERPL-MCNC: 22 MG/DL (ref 7–18)
CHLORIDE SERPL-SCNC: 101 MMOL/L (ref 98–107)
CO2 SERPL-SCNC: 25.9 MMOL/L (ref 21–32)
GLUCOSE SERPL-MCNC: 132 MG/DL (ref 74–106)
POTASSIUM SERPL-SCNC: 4.1 MMOL/L (ref 3.5–5.1)
SODIUM SERPL-SCNC: 139 MMOL/L (ref 136–145)

## 2019-09-12 PROCEDURE — 80053 COMPREHEN METABOLIC PANEL: CPT

## 2019-09-12 PROCEDURE — 99283 EMERGENCY DEPT VISIT LOW MDM: CPT

## 2019-09-12 PROCEDURE — 85025 COMPLETE CBC W/AUTO DIFF WBC: CPT

## 2019-09-12 NOTE — EDM.PDOC
ED HPI GENERAL MEDICAL PROBLEM





- General


Chief Complaint: Cardiovascular Problem


Stated Complaint: HYPERTENSION


Time Seen by Provider: 09/12/19 15:30


Source of Information: Reports: Patient


History Limitations: Reports: No Limitations





- History of Present Illness


INITIAL COMMENTS - FREE TEXT/NARRATIVE: 


History of present illness:


[]She was at the cancer center and found to have a high blood pressure and was 

told to come to the ER to be evaluated. Patient denies any symptoms including 

chest pain, dizziness, headache, blurry vision any change in urine or edema.





Review of systems: 


As per history of present illness and below otherwise all systems reviewed and 

negative.





Past medical history: 


As per history of present illness and as reviewed below otherwise 

noncontributory.





Surgical history: 


As per history of present illness and as reviewed below otherwise 

noncontributory.





Social history: 


No reported history of drug or alcohol abuse.





Family history: 


As per history of present illness and as reviewed below otherwise 

noncontributory.





Physical exam:


General: Well developed, well nourished in NAD


HEENT: Atraumatic, normocephalic, pupils reactive, negative for conjunctival 

pallor or scleral icterus, mucous membranes moist, throat clear, neck supple, 

nontender, trachea midline.


Lungs: Clear to auscultation, breath sounds equal bilaterally, chest nontender.


Heart: S1S2, regular, negative for clicks, rubs, or JVD.


Abdomen: NABS, Soft, nondistended, nontender. Negative for masses or 

hepatosplenomegaly. Negative for costovertebral tenderness.


Pelvis: Stable nontender.


Genitourinary: Deferred.


Rectal: Deferred.


Extremities: Atraumatic, negative for cords or calf pain. Neurovascular 

unremarkable.


Neuro: Awake, alert, oriented. Cranial nerves II through XII unremarkable. 

Cerebellum unremarkable. Motor and sensory unremarkable throughout. Exam 

nonfocal.


Skin:warm and dry





Diagnostics:


CBC, chemistry, cardiac monitoring


clonidine





ED Course:


Stable





Impression: 


Controlled hypertension





Prescriptions:


None





Plan:


Take meds as directed, follow up with your primary care physician, return to ER 

if symptoms worsen or change.





Definitive disposition and diagnosis as appropriate pending reevaluation and 

review of above.








- Related Data


 Allergies











Allergy/AdvReac Type Severity Reaction Status Date / Time


 


codeine Allergy Intermediate Rash Verified 09/12/19 15:42


 


celecoxib [From Celebrex] Allergy  Rash Verified 09/12/19 15:42


 


propoxyphene [From Darvon] Allergy  Rash Verified 09/12/19 15:42











Home Meds: 


 Home Meds





SitaGLIPtin [Januvia] 100 mg PO DAILY 09/18/16 [History]


amLODIPine [Norvasc] 5 mg PO BEDTIME 09/18/16 [History]


Ascorbic Acid [Vitamin C] 500 mg PO DAILY 01/18/17 [History]


Calcium Carbonate/Vitamin D3 [Calcium 600 + Vit D Tablet] 1 tab PO DAILY 01/18/ 17 [History]


Metoprolol Succinate [Toprol XL] 25 mg PO DAILY 01/18/17 [History]


Rosuvastatin [Crestor] 10 mg PO BEDTIME 01/18/17 [History]


Fluticasone Propionate [Flonase Allergy Relief] 2 sprays NASBOTH DAILY 08/21/17 

[History]


Propylene Glycol/Peg 400 [Systane 0.3-0.4% Eye Drops] 1 drop EYEBOTH TID 08/21/ 17 [History]


Acetaminophen [Tylenol Extra Strength] 1,000 mg PO Q8H PRN #60 tablet 08/24/17 [

Rx]


Cefdinir [IJD: Cefdinir] 300 mg PO BID #10 capsule 08/24/17 [Rx]


Iron Polysaccharides Complex [Ferrex 150] 150 mg PO DAILY #30 cap 08/24/17 [Rx]


oxyCODONE 5 mg PO Q6H PRN #15 tablet 08/24/17 [Rx]











Past Medical History


Cardiovascular History: Reports: Hypertension, Pacemaker


Respiratory History: Reports: None


Other Respiratory History: Hx of pneumonia


Gastrointestinal History: Reports: None


Neurological History: Reports: None


Psychiatric History: Reports: None


Endocrine/Metabolic History: Reports: Diabetes, Type II


Hematologic History: Reports: None


Other Oncologic History: as stated by the pt she has ca of the " pelvis, groin 

and chest" and she was ca free for 3 years now and that she is following up 

with her oncology dr.


Dermatologic History: Reports: None





- Infectious Disease History


Infectious Disease History: Reports: None





- Past Surgical History


HEENT Surgical History: Reports: Eye Surgery


Other HEENT Surgeries/Procedures: Wears glasses


Female  Surgical History: Reports: Hysterectomy


Musculoskeletal Surgical History: Reports: Knee Replacement





Social & Family History





- Family History


Family Medical History: Noncontributory


Cardiac: Reports: Hypertension, MI


GI: Reports: Other (See Below)


Other GI Family History: Intestinal CA


Endocrine/Metabolic: Reports: Diabetes, type II





- Tobacco Use


Smoking Status *Q: Never Smoker





- Caffeine Use


Caffeine Use: Reports: None





- Recreational Drug Use


Recreational Drug Use: No





ED ROS GENERAL





- Review of Systems


Review Of Systems: See Below





ED EXAM, GENERAL





- Physical Exam


Exam: See Below





Course





- Vital Signs


Last Recorded V/S: 


 Last Vital Signs











Temp  96.4 F   09/12/19 15:40


 


Pulse  66   09/12/19 15:40


 


Resp  20   09/12/19 15:40


 


BP  205/75 H  09/12/19 16:10


 


Pulse Ox  93 L  09/12/19 15:40














- Orders/Labs/Meds


Orders: 


 Active Orders 24 hr











 Category Date Time Status


 


 CBC WITH AUTO DIFF [HEME] Stat Lab  09/12/19 16:10 Received


 


 COMPREHENSIVE METABOLIC PN,CMP [CHEM] Stat Lab  09/12/19 16:10 Received


 


 Sodium Chloride 0.9% [Saline Flush] Med  09/12/19 15:52 Active





 10 ml FLUSH ASDIRECTED PRN   


 


 Sodium Chloride 0.9% [Saline Flush] Med  09/12/19 15:52 Active





 2.5 ml FLUSH ASDIRECTED PRN   


 


 Saline Lock Insert [OM.PC] Stat Oth  09/12/19 15:52 Ordered








 Medication Orders





Sodium Chloride (Saline Flush)  10 ml FLUSH ASDIRECTED PRN


   PRN Reason: Keep Vein Open


Sodium Chloride (Saline Flush)  2.5 ml FLUSH ASDIRECTED PRN


   PRN Reason: Keep Vein Open








Meds: 


Medications











Generic Name Dose Route Start Last Admin





  Trade Name Freq  PRN Reason Stop Dose Admin


 


Sodium Chloride  10 ml  09/12/19 15:52  





  Saline Flush  FLUSH   





  ASDIRECTED PRN   





  Keep Vein Open   





     





     





     


 


Sodium Chloride  2.5 ml  09/12/19 15:52  





  Saline Flush  FLUSH   





  ASDIRECTED PRN   





  Keep Vein Open   





     





     





     














Discontinued Medications














Generic Name Dose Route Start Last Admin





  Trade Name Freq  PRN Reason Stop Dose Admin


 


Clonidine HCl  0.2 mg  09/12/19 15:52  09/12/19 16:10





  Catapres  PO  09/12/19 15:53  0.2 mg





  ONETIME ONE   Administration





     





     





     





     














Departure





- Departure


Time of Disposition: 16:37


Disposition: Home, Self-Care 01


Condition: Good


Clinical Impression: 


 Uncontrolled hypertension





Referrals: 


Ricki Rizvi MD [Primary Care Provider] - 


Forms:  ED Department Discharge


Additional Instructions: 


The following information is given to patients seen in the emergency department 

who are being discharged to home. This information is to outline your options 

for follow-up care. We provide all patients seen in our emergency department 

with a follow-up referral.





The need for follow-up, as well as the timing and circumstances, are variable 

depending upon the specifics of your emergency department visit.





If you don't have a primary care physician on staff, we will provide you with a 

referral. We always advise you to contact your personal physician following an 

emergency department visit to inform them of the circumstance of the visit and 

for follow-up with them and/or the need for any referrals to a consulting 

specialist.





The emergency department will also refer you to a specialist when appropriate. 

This referral assures that you have the opportunity for follow-up care with a 

specialist. All of these measure are taken in an effort to provide you with 

optimal care, which includes your follow-up.





Under all circumstances we always encourage you to contact your private 

physician who remains a resource for coordinating your care. When calling for 

follow-up care, please make the office aware that this follow-up is from your 

recent emergency room visit. If for any reason you are refused follow-up, 

please contact the [facility] Emergency 

Department at [phone] and asked to speak to the emergency department 

charge nurse.





Take meds as directed, follow up with your primary care physician, return to ER 

if symptoms worsen or change.





[facility]


Primary Care


[address] [place] [id number]


Phone: [phone]


Fax: [phone]





- My Orders


Last 24 Hours: 


My Active Orders





09/12/19 15:52


Sodium Chloride 0.9% [Saline Flush]   10 ml FLUSH ASDIRECTED PRN 


Sodium Chloride 0.9% [Saline Flush]   2.5 ml FLUSH ASDIRECTED PRN 


Saline Lock Insert [OM.PC] Stat 





09/12/19 16:10


CBC WITH AUTO DIFF [HEME] Stat 


COMPREHENSIVE METABOLIC PN,CMP [CHEM] Stat 














- Assessment/Plan


Last 24 Hours: 


My Active Orders





09/12/19 15:52


Sodium Chloride 0.9% [Saline Flush]   10 ml FLUSH ASDIRECTED PRN 


Sodium Chloride 0.9% [Saline Flush]   2.5 ml FLUSH ASDIRECTED PRN 


Saline Lock Insert [OM.PC] Stat 





09/12/19 16:10


CBC WITH AUTO DIFF [HEME] Stat 


COMPREHENSIVE METABOLIC PN,CMP [CHEM] Stat

## 2020-01-18 NOTE — EDM.PDOC
ED [facility] GENERAL MEDICAL PROBLEM





- General


Chief Complaint: Flank Pain


Stated Complaint: BACK PAIN RADIATING TO THE FRONT


Time Seen by Provider: 01/18/20 09:00


Source of Information: Reports: Patient


History Limitations: Reports: No Limitations





- History of Present Illness


INITIAL COMMENTS - FREE TEXT/NARRATIVE: 


Patient is an 85-year-old female with a past medical history of cancer but not 

on any active chemo.  Patient has been cancer free for 3 years.  Patient 

reports 2-day history of left flank pain.  Patient states the flank pain is 

constant and radiates to the left groin.  Patient reports associated dysuria 

but no hematuria.  Denies fevers, chills, nausea, vomiting.  Reports history of 

similar prior symptoms related to urinary tract infection.  Denies history of 

kidney stones.





In addition to that documented in the HPI above, the additional ROS was obtained

:


Constitutional: Denies fevers or chills


Eyes: Denies vision changes


ENMT: Denies sore throat


CV: Denies chest pain


Resp: Denies SOB


GI: Denies vomiting or diarrhea


: Per HPI


MSK: Positive back pain


Skin: Denies new rashes


Neuro: Denies new numbness or tingling or weakness


Endocrine: Denies unexpected weight loss


Heme: Denies bleeding disorders





I have reviewed the triage vital signs


Const: Well nourished, well developed, appears stated age


Eyes: PERRL, no conjunctival injection


HENT: NCAT, Neck supple without meningismus 


CV: RRR, Warm, well-perfused extremities


RESP: CTAB, Unlabored respiratory effort


GI: soft, non-tender, non-distended, no masses


MSK: No gross deformities appreciated


Skin: Warm, dry. No rashes


Neuro: Alert, CNs II-XII grossly intact. Sensation and motor function of 

extremities grossly intact.


Psych: Appropriate mood and affect





Assessment and plan:


Patient is an 85-year-old female presenting with dysuria and flank pain.  

Patient has normal vital signs and appears well.  Laboratory studies do not 

demonstrate any evidence of systemic infection and CT scan was done to rule out 

any coexisting kidney stone.  Patient's urinalysis consistent with urinary 

tract infection.  Given patient's exam and lack of evidence for systemic 

symptoms and systemic infection, patient will be treated as an outpatient with 

antibiotics.








  ** left flank


Pain Score (Numeric/FACES): 9





- Related Data


 Allergies











Allergy/AdvReac Type Severity Reaction Status Date / Time


 


codeine Allergy Intermediate Rash Verified 09/12/19 15:42


 


celecoxib [From Celebrex] Allergy  Rash Verified 09/12/19 15:42


 


morphine Allergy  Vomiting Verified 01/18/20 08:01


 


propoxyphene [From Darvon] Allergy  Rash Verified 09/12/19 15:42











Home Meds: 


 Home Meds





Ascorbic Acid [Vitamin C] 1 tab PO DAILY 01/18/20 [History]


Calcium Carbonate/Vitamin D3 [Calcium 600 + Vit D Tablet] 1 each PO DAILY 01/18/ 20 [History]


Fluticasone Propionate [Flovent Hfa] 12 gm IH DAILY 01/18/20 [History]


Losartan Potassium 100 mg PO DAILY 01/18/20 [History]


Metoprolol Succinate 25 mg PO DAILY 01/18/20 [History]


SitaGLIPtin [Januvia] 50 mg PO DAILY 01/18/20 [History]


atorvaSTATin [Lipitor] 20 mg PO BEDTIME 01/18/20 [History]


cephALEXin [Keflex] 250 mg PO Q6H 7 Days #28 cap 01/18/20 [Rx]











Past Medical History


Cardiovascular History: Reports: Hypertension, Pacemaker


Respiratory History: Reports: Pneumonia, Recurrent


Other Respiratory History: Hx of pneumonia


Gastrointestinal History: Reports: None


Neurological History: Reports: None


Psychiatric History: Reports: None


Endocrine/Metabolic History: Reports: Diabetes, Type II


Hematologic History: Reports: None


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


Other Oncologic History: as stated by the pt she has ca of the " pelvis, groin 

and chest" and she was ca free for 3 years now and that she is following up 

with her oncology dr.


Dermatologic History: Reports: None





- Infectious Disease History


Infectious Disease History: Reports: Chicken Pox, Measles, Shingles





- Past Surgical History


HEENT Surgical History: Reports: Eye Surgery


Other HEENT Surgeries/Procedures: Wears glasses


Female  Surgical History: Reports: Hysterectomy


Musculoskeletal Surgical History: Reports: Knee Replacement





Social & Family History





- Family History


Family Medical History: Noncontributory


Cardiac: Reports: Hypertension, MI


GI: Reports: Other (See Below)


Other GI Family History: Intestinal CA


Endocrine/Metabolic: Reports: Diabetes, type II





- Tobacco Use


Smoking Status *Q: Never Smoker





- Caffeine Use


Caffeine Use: Reports: None





- Recreational Drug Use


Recreational Drug Use: No





ED ROS GENERAL





- Review of Systems


Review Of Systems: See Below





ED EXAM, RENAL/





- Physical Exam


Exam: See Below





Course





- Vital Signs


Last Recorded V/S: 


 Last Vital Signs











Temp  36.1 C   01/18/20 10:32


 


Pulse  65   01/18/20 10:32


 


Resp  18   01/18/20 10:32


 


BP  174/58 H  01/18/20 10:32


 


Pulse Ox  94 L  01/18/20 10:32














- Orders/Labs/Meds


Orders: 


 Active Orders 24 hr











 Category Date Time Status


 


 CULTURE URINE [RM] Stat Lab  01/18/20 09:22 Received











Labs: 


 Laboratory Tests











  01/18/20 01/18/20 01/18/20 Range/Units





  08:40 08:40 09:22 


 


WBC  7.13    (4.0-11.0)  K/uL


 


RBC  3.79 L    (4.30-5.90)  M/uL


 


Hgb  12.3    (12.0-16.0)  g/dL


 


Hct  36.0    (36.0-46.0)  %


 


MCV  95.0    (80.0-98.0)  fL


 


MCH  32.5 H    (27.0-32.0)  pg


 


MCHC  34.2    (31.0-37.0)  g/dL


 


RDW Std Deviation  48.1    (28.0-62.0)  fl


 


RDW Coeff of [place]  14    (11.0-15.0)  %


 


Plt Count  157    (150-400)  K/uL


 


MPV  10.80    (7.40-12.00)  fL


 


Neut % (Auto)  82.9 H    (48.0-80.0)  %


 


Lymph % (Auto)  9.3 L    (16.0-40.0)  %


 


Mono % (Auto)  4.9    (0.0-15.0)  %


 


Eos % (Auto)  2.5    (0.0-7.0)  %


 


Baso % (Auto)  0.4    (0.0-1.5)  %


 


Neut # (Auto)  5.9 H    (1.4-5.7)  K/uL


 


Lymph # (Auto)  0.7    (0.6-2.4)  K/uL


 


Mono # (Auto)  0.4    (0.0-0.8)  K/uL


 


Eos # (Auto)  0.2    (0.0-0.7)  K/uL


 


Baso # (Auto)  0.0    (0.0-0.1)  K/uL


 


Nucleated RBC %  0.0    /100WBC


 


Nucleated RBCs #  0    K/uL


 


Sodium   141   (136-145)  mmol/L


 


Potassium   4.0   (3.5-5.1)  mmol/L


 


Chloride   102   ()  mmol/L


 


Carbon Dioxide   26.1   (21.0-32.0)  mmol/L


 


BUN   21 H   (7.0-18.0)  mg/dL


 


Creatinine   1.2 H   (0.6-1.0)  mg/dL


 


Est Cr Clr Drug Dosing   30.84   mL/min


 


Estimated GFR (MDRD)   42.7   ml/min


 


Glucose   217 H   ()  mg/dL


 


Calcium   9.7   (8.5-10.1)  mg/dL


 


Total Bilirubin   0.8   (0.2-1.0)  mg/dL


 


AST   13 L   (15-37)  IU/L


 


ALT   20   (14-63)  IU/L


 


Alkaline Phosphatase   114   ()  U/L


 


Total Protein   7.2   (6.4-8.2)  g/dL


 


Albumin   3.9   (3.4-5.0)  g/dL


 


Globulin   3.3   (2.6-4.0)  g/dL


 


Albumin/Globulin Ratio   1.2   (0.9-1.6)  


 


Urine Color    YELLOW  


 


Urine Appearance    SLT CLOUDY  


 


Urine pH    7.5  (5.0-8.0)  


 


Ur Specific Gravity    1.015  (1.001-1.035)  


 


Urine Protein    NEGATIVE  (NEGATIVE)  mg/dL


 


Urine Glucose (UA)    NEGATIVE  (NEGATIVE)  mg/dL


 


Urine Ketones    NEGATIVE  (NEGATIVE)  mg/dL


 


Urine Occult Blood    NEGATIVE  (NEGATIVE)  


 


Urine Nitrite    POSITIVE H  (NEGATIVE)  


 


Urine Bilirubin    NEGATIVE  (NEGATIVE)  


 


Urine Urobilinogen    0.2  (<2.0)  EU/dL


 


Ur Leukocyte Esterase    NEGATIVE  (NEGATIVE)  


 


Urine RBC    0-1  (0-2/HPF)  


 


Urine WBC    1-2  (0-5/HPF)  


 


Ur Epithelial Cells    OCCASIONAL  (NONE-FEW)  


 


Urine Bacteria    1+ H  (NEGATIVE)  


 


Urinalysis Comment      











Meds: 


Medications














Discontinued Medications














Generic Name Dose Route Start Last Admin





  Trade Name Freq  PRN Reason Stop Dose Admin


 


Cephalexin  500 mg  01/18/20 09:50  01/18/20 10:15





  Keflex  PO  01/18/20 09:51  500 mg





  ONETIME ONE   Administration





     





     





     





     














Departure





- Departure


Time of Disposition: 09:51


Disposition: Home, Self-Care 01


Clinical Impression: 


 UTI, Urinary tract infectious disease








- Discharge Information


Prescriptions: 


cephALEXin [Keflex] 250 mg PO Q6H 7 Days #28 cap


Instructions:  Urinary Tract Infection, Adult, Easy-to-Read


Referrals: 


Ricki Rizvi MD [Primary Care Provider] - 


Forms:  ED Department Discharge


Additional Instructions: 


The following information is given to patients seen in the emergency department 

who are being discharged to home. This information is to outline your options 

for follow-up care. We provide all patients seen in our emergency department 

with a follow-up referral.





The need for follow-up, as well as the timing and circumstances, are variable 

depending upon the specifics of your emergency department visit.





If you don't have a primary care physician on staff, we will provide you with a 

referral. We always advise you to contact your personal physician following an 

emergency department visit to inform them of the circumstance of the visit and 

for follow-up with them and/or the need for any referrals to a consulting 

specialist.





The emergency department will also refer you to a specialist when appropriate. 

This referral assures that you have the opportunity for follow-up care with a 

specialist. All of these measure are taken in an effort to provide you with 

optimal care, which includes your follow-up.





Under all circumstances we always encourage you to contact your private 

physician who remains a resource for coordinating your care. When calling for 

follow-up care, please make the office aware that this follow-up is from your 

recent emergency room visit. If for any reason you are refused follow-up, 

please contact the [facility] Emergency 

Department at [phone] and asked to speak to the emergency department 

charge nurse.








Sepsis Event Note





- Evaluation


Sepsis Screening Result: No Definite Risk





- Focused Exam


Date Exam was Performed: 01/19/20


Time Exam was Performed: 07:33





- My Orders


Last 24 Hours: 


My Active Orders





01/18/20 09:22


CULTURE URINE [RM] Stat 














- Assessment/Plan


Last 24 Hours: 


My Active Orders





01/18/20 09:22


CULTURE URINE [RM] Stat

## 2020-01-18 NOTE — CT
CT abdomen and pelvis

 

Technique: Multiple axial sections were obtained from above the dome 

of the diaphragm inferiorly through the pubic symphysis.  Intravenous 

and oral contrast was not utilized.  Study has been performed as a 

ureteral stone protocol.

 

Comparison: Previous CT abdomen and pelvis exams of 09/03/19 and 

03/01/18.

 

Findings: Visualized lung bases show nothing acute.

 

Noncontrast appearance of the liver shows no discrete abnormality.  

Spleen appears within normal limits.  Adrenal glands show no nodule.  

Pancreas is within normal limits.  

 

Gallbladder shows several small areas of increased density most likely

 representing small gallstones.  This finding is not definitely 

appreciated on the previous exam.

 

Aorta shows atherosclerotic calcification which continues into the 

iliac vessels.  No aneurysm is identified.  No retroperitoneal 

adenopathy or mesenteric abnormalities are seen.  No pelvic mass or 

adenopathy is noted.  Mild increased stool within the colon is seen 

which includes the rectum.  Diverticuli are seen within the sigmoid 

colon with no inflammatory change of diverticulitis.  Several metallic

 densities are seen within the lower pelvis most likely due to 

surgical clips.

 

Kidneys show no abnormal calcifications.  No ureteral dilatation is 

seen.  No ureteral calcifications are seen.

 

No bowel dilatation is seen.  Appendix is felt to be seen and is 

normal in size.

 

Bone window settings were reviewed which shows stable focal areas of 

lucency within the L2 and L5 vertebral bodies.  Scattered degenerative

 change is also noted within the spine.  No acute osseous finding is 

seen.

 

Impression:

1.  Mild increased stool within the colon which includes the rectum.

2.  Other findings believed to be stable and therefore incidental.

3.  No renal calculi, ureteral dilatation or ureteral stone is seen.

4.  Nothing acute is appreciated on noncontrast CT study of the 

abdomen and pelvis performed as a ureteral stone protocol.

 

Diagnostic code #2

 

This report was dictated in [place] Standard Time

## 2020-11-20 NOTE — EDM.PDOC
ED HPI GENERAL MEDICAL PROBLEM





- General


Chief Complaint: Cardiovascular Problem


Stated Complaint: AMBULANCE


Time Seen by Provider: 11/20/20 11:50


Source of Information: Reports: Patient


History Limitations: Reports: No Limitations





- History of Present Illness


INITIAL COMMENTS - FREE TEXT/NARRATIVE: 





Is 86-year-old female who was sent in to have elevated blood pressure.  Patient 

she is going for routine follow-up and was found to have a blood pressure 

systolic greater than 200.  Patient denies any symptoms that she did have a 

slight headache earlier this morning but currently has no pain no confusion no 

chest pain no urinary symptoms.  Patient does have history of high blood 

pressure and took her meds morning per the patient.


  ** headache


Pain Score (Numeric/FACES): 2





- Related Data


                                    Allergies











Allergy/AdvReac Type Severity Reaction Status Date / Time


 


codeine Allergy Intermediate Rash Verified 09/12/19 15:42


 


celecoxib [From Celebrex] Allergy  Rash Verified 09/12/19 15:42


 


morphine Allergy  Vomiting Verified 01/18/20 08:01


 


propoxyphene [From Darvon] Allergy  Rash Verified 09/12/19 15:42











Home Meds: 


                                    Home Meds





Metoprolol Succinate 25 mg PO DAILY 01/18/20 [History]


SitaGLIPtin [Januvia] 50 mg PO DAILY 01/18/20 [History]


atorvaSTATin [Lipitor] 20 mg PO BEDTIME 01/18/20 [History]


Furosemide [Lasix] 40 mg PO DAILY 11/20/20 [History]


Olmesartan Medoxomil [Benicar] 40 mg PO DAILY 11/20/20 [History]











Past Medical History


Cardiovascular History: Reports: Hypertension, Pacemaker


Respiratory History: Reports: Pneumonia, Recurrent


Other Respiratory History: Hx of pneumonia


Gastrointestinal History: Reports: None


Neurological History: Reports: None


Psychiatric History: Reports: None


Endocrine/Metabolic History: Reports: Diabetes, Type II


Hematologic History: Reports: None


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


Other Oncologic History: as stated by the pt she has ca of the " pelvis, groin 

and chest" and she was ca free for 3 years now and that she is following up with

 her oncology dr.


Dermatologic History: Reports: None





- Infectious Disease History


Infectious Disease History: Reports: Chicken Pox, Measles, Shingles





- Past Surgical History


HEENT Surgical History: Reports: Eye Surgery


Other HEENT Surgeries/Procedures: Wears glasses


Female  Surgical History: Reports: Hysterectomy


Musculoskeletal Surgical History: Reports: Knee Replacement





Social & Family History





- Family History


Family Medical History: No Pertinent Family History


Cardiac: Reports: Hypertension, MI


GI: Reports: Other (See Below)


Other GI Family History: Intestinal CA


Endocrine/Metabolic: Reports: Diabetes, type II





- Caffeine Use


Caffeine Use: Reports: None





ED ROS GENERAL





- Review of Systems


Review Of Systems: See Below


Constitutional: Reports: No Symptoms


HEENT: Reports: No Symptoms


Respiratory: Reports: No Symptoms


Cardiovascular: Reports: No Symptoms


Endocrine: Reports: No Symptoms


GI/Abdominal: Reports: No Symptoms


: Reports: No Symptoms


Musculoskeletal: Reports: No Symptoms


Skin: Reports: No Symptoms


Neurological: Reports: No Symptoms


Psychiatric: Reports: No Symptoms


Hematologic/Lymphatic: Reports: No Symptoms


Immunologic: Reports: No Symptoms





ED EXAM, GENERAL





- Physical Exam


Exam: See Below


Exam Limited By: No Limitations


General Appearance: Alert, No Apparent Distress


Eye Exam: Bilateral Eye: EOMI, PERRL


Ears: Normal External Exam


Respiratory/Chest: No Respiratory Distress, Lungs Clear, Normal Breath Sounds


Cardiovascular: Normal Peripheral Pulses, Regular Rate, Rhythm


GI/Abdominal: Normal Bowel Sounds, Soft, Non-Tender


Back Exam: Normal Inspection, Full Range of Motion


Extremities: Normal Inspection, Normal Range of Motion, Non-Tender


Neurological: Alert, Oriented, CN II-XII Intact, Normal Cognition, Normal Gait


  ** #1 Interpretation


EKG Date: 11/20/20


Time: 14:00


Rhythm: Other (atrail paced)


Rate (Beats/Min): 60


ST-T: Normal





Course





- Vital Signs


Last Recorded V/S: 


                                Last Vital Signs











Temp  97 F   11/20/20 11:50


 


Pulse  60   11/20/20 13:45


 


Resp  17   11/20/20 13:45


 


BP  190/70 H  11/20/20 13:45


 


Pulse Ox  95   11/20/20 13:45














- Orders/Labs/Meds


Orders: 


                               Active Orders 24 hr











 Category Date Time Status


 


 EKG 12 Lead [EKG Documentation Completion] [RC] STAT Care  11/20/20 13:52 

Active











Labs: 


                                Laboratory Tests











  11/20/20 11/20/20 11/20/20 Range/Units





  12:38 12:38 13:05 


 


WBC    8.16  (4.0-11.0)  K/uL


 


RBC    3.85 L  (4.30-5.90)  M/uL


 


Hgb    12.1  (12.0-16.0)  g/dL


 


Hct    36.9  (36.0-46.0)  %


 


MCV    95.8  (80.0-98.0)  fL


 


MCH    31.4  (27.0-32.0)  pg


 


MCHC    32.8  (31.0-37.0)  g/dL


 


RDW Std Deviation    47.2  (28.0-62.0)  fl


 


RDW Coeff of [place]    14  (11.0-15.0)  %


 


Plt Count    148 L  (150-400)  K/uL


 


MPV    10.40  (7.40-12.00)  fL


 


Neut % (Auto)    81.1 H  (48.0-80.0)  %


 


Lymph % (Auto)    9.7 L  (16.0-40.0)  %


 


Mono % (Auto)    6.6  (0.0-15.0)  %


 


Eos % (Auto)    2.5  (0.0-7.0)  %


 


Baso % (Auto)    0.1  (0.0-1.5)  %


 


Neut # (Auto)    6.6 H  (1.4-5.7)  K/uL


 


Lymph # (Auto)    0.8  (0.6-2.4)  K/uL


 


Mono # (Auto)    0.5  (0.0-0.8)  K/uL


 


Eos # (Auto)    0.2  (0.0-0.7)  K/uL


 


Baso # (Auto)    0.0  (0.0-0.1)  K/uL


 


Nucleated RBC %    0.0  /100WBC


 


Nucleated RBCs #    0  K/uL


 


Sodium  141    (136-145)  mmol/L


 


Potassium  4.2    (3.5-5.1)  mmol/L


 


Chloride  104    ()  mmol/L


 


Carbon Dioxide  26.9    (21.0-32.0)  mmol/L


 


BUN  26 H    (7.0-18.0)  mg/dL


 


Creatinine  1.1 H    (0.6-1.0)  mg/dL


 


Est Cr Clr Drug Dosing  30.37    mL/min


 


Estimated GFR (MDRD)  47.1    ml/min


 


Glucose  182 H    ()  mg/dL


 


Calcium  9.5    (8.5-10.1)  mg/dL


 


Troponin I   < 0.050   (0.000-0.056)  ng/mL














Departure





- Departure


Time of Disposition: 14:00


Disposition: Home, Self-Care 01


Condition: Good


Clinical Impression: 


 Asymptomatic hypertension





Instructions:  Hypertension, Adult, Easy-to-Read


Referrals: 


Ricki Rizvi MD [Primary Care Provider] - 


Forms:  ED Department Discharge


Additional Instructions: 


The following information is given to patients seen in the emergency department 

who are being discharged to home. This information is to outline your options 

for follow-up care. We provide all patients seen in our emergency department 

with a follow-up referral.





The need for follow-up, as well as the timing and circumstances, are variable 

depending upon the specifics of your emergency department visit.





If you don't have a primary care physician on staff, we will provide you with a 

referral. We always advise you to contact your personal physician following an 

emergency department visit to inform them of the circumstance of the visit and 

for follow-up with them and/or the need for any referrals to a consulting 

specialist.





The emergency department will also refer you to a specialist when appropriate. 

This referral assures that you have the opportunity for follow-up care with a 

specialist. All of these measure are taken in an effort to provide you with 

optimal care, which includes your follow-up.





Under all circumstances we always encourage you to contact your private 

physician who remains a resource for coordinating your care. When calling for 

follow-up care, please make the office aware that this follow-up is from your 

recent emergency room visit. If for any reason you are refused follow-up, please

contact the [facility] Emergency Department

at [phone] and asked to speak to the emergency department charge nurse.





Please follow up with your primary care physician. If you do not have a primary 

care physician, see below:


[facility]


[address] [place] [id number] [phone]





[facility]


[address] [place] [id number] [phone]








[facility] primary care physician as needed.  Your blood pressure slightly elevated 

but you remained to be asymptomatic.  If you develop any confusion headaches 

chest pain or difficulty urinating please return to the ED.  Otherwise call your

PMD as you may need adjustments to your blood pressure medication.





Sepsis Event Note (ED)





- Focused Exam


Vital Signs: 


                                   Vital Signs











  Temp Pulse Resp BP Pulse Ox


 


 11/20/20 13:45   60  17  190/70 H  95


 


 11/20/20 13:15   60  18  199/71 H  96


 


 11/20/20 13:00   70  18  211/68 H  96


 


 11/20/20 11:50  97 F  60  18  135/98 H  96














- My Orders


Last 24 Hours: 


My Active Orders





11/20/20 13:52


EKG 12 Lead [EKG Documentation Completion] [RC] STAT 














- Assessment/Plan


Last 24 Hours: 


My Active Orders





11/20/20 13:52


EKG 12 Lead [EKG Documentation Completion] [RC] STAT 











Plan: 





Is a 86-year-old female who symptoms have elevated blood pressure.  Patient is 

asymptomatic.  Will get basic labs observe and likely discharge home.